# Patient Record
Sex: FEMALE | Race: WHITE
[De-identification: names, ages, dates, MRNs, and addresses within clinical notes are randomized per-mention and may not be internally consistent; named-entity substitution may affect disease eponyms.]

---

## 2017-07-03 NOTE — PD
HPI


Chief Complaint:  Pregnancy Related Problem


Time Seen by Provider:  09:39


Travel History


International Travel<30 days:  No


Contact w/Intl Traveler<30days:  No


Traveled to known affect area:  No





History of Present Illness


HPI


PATIENT C/O PASSING CLOTS THIS MORNING, NO ACTIVE ABD CRAMPS, NO N/V/D/ABD PAIN/

CP/VISUAL CHANGES EITHER.  PATIENT IS    ......ABOUT 8 WEEKS GESTATION 

BASED ON DATES  OBGYN=DR WHITE? HAS FIRST APPOINTMENT UPCOMING.





[facility]


Past Medical History


Hx Anticoagulant Therapy:  No


Pregnant?:  Pregnant





Social History


Tobacco Use:  No





Allergies-Medications


(Allergen,Severity, Reaction):  


Coded Allergies:  


     Naproxen (Verified  Allergy, Severe, 7/3/17)


Reported Meds & Prescriptions





Reported Meds & Active Scripts


Active


No Active Prescriptions or Reported Medications    








Physical Exam


Narrative


GENERAL: 


SKIN: Warm and dry.


HEAD: Atraumatic. Normocephalic. 


EYES: Pupils equal and round. No scleral icterus. No injection or drainage. 


ENT: No nasal bleeding or discharge.  Mucous membranes pink and moist.


NECK: Trachea midline. No JVD. 


CARDIOVASCULAR: Regular rate and rhythm.  


RESPIRATORY: No accessory muscle use. Clear to auscultation. Breath sounds 

equal bilaterally. 


GASTROINTESTINAL: Abdomen soft, non-tender, nondistended. INSPECTION REVEALS A 

GRAVID ABDOMEN, WITHOUT TTP/GUARDING/RIGIDITY


MUSCULOSKELETAL: Extremities without clubbing, cyanosis, or edema. No obvious 

deformities. 


NEUROLOGICAL: Awake and alert. No obvious cranial nerve deficits.  Motor 

grossly within normal limits. Five out of 5 muscle strength in the arms and 

legs.  Normal speech.


PSYCHIATRIC: Appropriate mood and affect; insight and judgment normal.





Data


Data


Last Documented VS





Vital Signs








  Date Time  Temp Pulse Resp B/P Pulse Ox O2 Delivery O2 Flow Rate FiO2


 


7/3/17 09:35 98.3 73 16 139/78 98   








Orders





 Beta Hcg (Quant/Titer) (7/3/17 09:39)


Complete Blood Count With Diff (7/3/17 09:39)


Comprehensive Metabolic Panel (7/3/17 09:39)


Type And Screen (7/3/17 09:39)


Urinalysis - C+S If Indicated (7/3/17 09:39)


Iv Access Insert/Monitor (7/3/17 09:39)


Sodium Chlor 0.9% 1000 Ml Inj (Ns 1000 M (7/3/17 09:39)


Us Pelvis (Ques Pr/Ect)W Trans (7/3/17 )





Labs








 Laboratory Tests








Test 7/3/17





 09:55


 


White Blood Count 9.6 TH/MM3


 


Red Blood Count 4.37 MIL/MM3


 


Hemoglobin 12.5 GM/DL


 


Hematocrit 37.0 %


 


Mean Corpuscular Volume 84.5 FL


 


Mean Corpuscular Hemoglobin 28.6 PG


 


Mean Corpuscular Hemoglobin 33.9 %





Concent 


 


Red Cell Distribution Width 13.8 %


 


Platelet Count 227 TH/MM3


 


Mean Platelet Volume 8.8 FL


 


Neutrophils (%) (Auto) 62.5 %


 


Lymphocytes (%) (Auto) 25.9 %


 


Monocytes (%) (Auto) 5.4 %


 


Eosinophils (%) (Auto) 5.1 %


 


Basophils (%) (Auto) 1.1 %


 


Neutrophils # (Auto) 6.0 TH/MM3


 


Lymphocytes # (Auto) 2.5 TH/MM3


 


Monocytes # (Auto) 0.5 TH/MM3


 


Eosinophils # (Auto) 0.5 TH/MM3


 


Basophils # (Auto) 0.1 TH/MM3


 


CBC Comment DIFF FINAL 


 


Differential Comment  


 


Urine Collection Type VOIDED 


 


Urine Color LIGHT-ORANGE 


 


Urine Turbidity CLOUDY 


 


Urine pH 7.0 


 


Urine Specific Gravity 1.012 


 


Urine Protein NEG mg/dL


 


Urine Glucose (UA) NEG mg/dL


 


Urine Ketones NEG mg/dL


 


Urine Occult Blood LARGE 


 


Urine Nitrite NEG 


 


Urine Bilirubin NEG 


 


Urine Leukocyte Esterase SMALL 


 


Urine RBC INNUM /hpf


 


Urine WBC 3-5 /hpf


 


Urine Squamous Epithelial 6-8 /hpf





Cells 


 


Urine Bacteria RARE /hpf


 


Microscopic Urinalysis Comment CULT NOT





 INDICATED


 


Sodium Level 140 MEQ/L


 


Potassium Level 3.8 MEQ/L


 


Chloride Level 105 MEQ/L


 


Carbon Dioxide Level 25.8 MEQ/L


 


Anion Gap 9 MEQ/L


 


Blood Urea Nitrogen 8 MG/DL


 


Creatinine 0.54 MG/DL


 


Estimat Glomerular Filtration 133 ML/MIN





Rate 


 


Random Glucose 88 MG/DL


 


Calcium Level 8.9 MG/DL


 


Total Bilirubin 0.3 MG/DL


 


Aspartate Amino Transf 12 U/L





(AST/SGOT) 


 


Alanine Aminotransferase 23 U/L





(ALT/SGPT) 


 


Alkaline Phosphatase 58 U/L


 


Total Protein 7.4 GM/DL


 


Albumin 3.1 GM/DL


 


Human Chorionic Gonadotropin, 79230 MIU/ML





Quant 


 


Blood Type A POSITIVE 


 


Antibody Screen NEGATIVE 


 


Blood Bank Comment  














[facility]


Medical Decision Making


Medical Screen Exam Complete:  Yes


Emergency Medical Condition:  Yes


Medical Record Reviewed:  Yes


Differential Diagnosis


UTI V THREATENED AB V MISSED AB V ECTOPIC


Narrative Course


CBC WNL, CMP WNL, NEG UTI ON UA, ULTRASOUND AND QUANT HCG SUGGEST IUP AT 

[id number]....WILL D/C TO FOLLOW WITH OBGYN





Diagnosis





 Primary Impression:  


 THREATENED AB


Patient Instructions:  General Instructions, Threatened Miscarriage (ED)


Scripts


No Active Prescriptions or Reported Meds


Disposition:   DISCHARGE HOME


Condition:  Stable








Korey Licona MD Jul 3, 2017 09:43

## 2017-07-03 NOTE — RADRPT
EXAM DATE/TIME:  2017 11:26 

 

[facility] COMPARISON:     

No previous studies available for comparison.

        

 

 

INDICATIONS :     

Bleeding with pregnancy.

                     

 

LAB(S):     

 

Beta-hC

                     

 

MEDICAL HISTORY :     

Pregnancy.     Gestational diabetes.

 

SURGICAL HISTORY :     

Cholecystectomy.  section.   

 

ENCOUNTER:     

Initial

 

ACUITY:     

1 day

 

PAIN SCORE:     

0/10

 

LOCATION:     

Bilateral pelvis 

                     

MEASUREMENTS:     

 

UTERUS:                                  

11.4 x 8.2 x 5.2 cm

 

ENDOMETRIAL STRIPE:      

12 mm

 

RIGHT OVARY:                      

5.0 x 3.2 x 3.2 cm

 

LEFT OVARY:                         

Non visualized. 

 

FREE FLUID:                           

No free fluid

 

CROWN RUMP LENGTH:     

1.2 cm = 7 WKS 3 DAYS

 

FHR:                                         

136 BPM

 

FINDINGS:     

There is viable intrauterine pregnancy with what looks likely small subchorionic hemorrhage.  There i
s minimal debris within the endocervical canal.  Left ovary is not visualized.  The right ovary conta
ins is 3.9 cm complex cystic mass.  There is no free fluid.

 

CONCLUSION:     

Viable but threatened intrauterine pregnancy corresponding to a 7 week 3 day gestation.

 

 

 

 Raymundo Fuchs MD FACR on 2017 at 12:53           

Board Certified Radiologist.

 This report was verified electronically.

## 2017-08-11 ENCOUNTER — HOSPITAL ENCOUNTER (OUTPATIENT)
Dept: HOSPITAL 17 - HPND | Age: 27
End: 2017-08-11
Attending: FAMILY MEDICINE
Payer: MEDICAID

## 2017-08-11 DIAGNOSIS — O20.0: Primary | ICD-10-CM

## 2017-08-11 PROCEDURE — 76801 OB US < 14 WKS SINGLE FETUS: CPT

## 2017-08-11 PROCEDURE — 76817 TRANSVAGINAL US OBSTETRIC: CPT

## 2017-09-26 ENCOUNTER — HOSPITAL ENCOUNTER (OUTPATIENT)
Dept: HOSPITAL 17 - HPND | Age: 27
End: 2017-09-26
Attending: FAMILY MEDICINE
Payer: MEDICAID

## 2017-09-26 DIAGNOSIS — O99.212: Primary | ICD-10-CM

## 2017-09-26 PROCEDURE — 76811 OB US DETAILED SNGL FETUS: CPT

## 2017-11-07 ENCOUNTER — HOSPITAL ENCOUNTER (OUTPATIENT)
Dept: HOSPITAL 17 - HPND | Age: 27
End: 2017-11-07
Attending: FAMILY MEDICINE
Payer: MEDICAID

## 2017-11-07 DIAGNOSIS — O99.212: Primary | ICD-10-CM

## 2017-11-07 DIAGNOSIS — E66.01: ICD-10-CM

## 2017-11-07 PROCEDURE — 76816 OB US FOLLOW-UP PER FETUS: CPT

## 2017-12-05 ENCOUNTER — HOSPITAL ENCOUNTER (OUTPATIENT)
Dept: HOSPITAL 17 - HPND | Age: 27
End: 2017-12-05
Attending: FAMILY MEDICINE
Payer: MEDICAID

## 2017-12-05 DIAGNOSIS — E66.01: ICD-10-CM

## 2017-12-05 DIAGNOSIS — O99.212: Primary | ICD-10-CM

## 2017-12-05 PROCEDURE — 76816 OB US FOLLOW-UP PER FETUS: CPT

## 2018-01-04 ENCOUNTER — HOSPITAL ENCOUNTER (OUTPATIENT)
Dept: HOSPITAL 17 - HPND | Age: 28
End: 2018-01-04
Attending: FAMILY MEDICINE
Payer: MEDICAID

## 2018-01-04 DIAGNOSIS — O99.213: Primary | ICD-10-CM

## 2018-01-04 DIAGNOSIS — E66.01: ICD-10-CM

## 2018-01-04 PROCEDURE — 76816 OB US FOLLOW-UP PER FETUS: CPT

## 2018-01-10 ENCOUNTER — HOSPITAL ENCOUNTER (EMERGENCY)
Dept: HOSPITAL 17 - HOBED | Age: 28
Discharge: HOME | End: 2018-01-10
Payer: MEDICAID

## 2018-01-10 DIAGNOSIS — Z3A.34: ICD-10-CM

## 2018-01-10 DIAGNOSIS — Z79.84: ICD-10-CM

## 2018-01-10 DIAGNOSIS — O24.419: ICD-10-CM

## 2018-01-10 DIAGNOSIS — O47.03: Primary | ICD-10-CM

## 2018-01-10 DIAGNOSIS — Z72.0: ICD-10-CM

## 2018-01-10 PROCEDURE — 59025 FETAL NON-STRESS TEST: CPT

## 2018-01-10 NOTE — PD
MDM


Diagnosis


Diagnosis:  


 Primary Impression:  


 34 weeks gestation of pregnancy


 Additional Impression:  


 False labor before 37 completed weeks of gestation during pregnancy in third 

trimester, antepartum


Disposition:  01 DISCHARGE HOME


Condition:  Good


Patient Instructions:  General Instructions, Gestational Diabetes (ED),  

Labor (ED), Having Your Baby: The Labor Process (GEN), Fetal Movement (ED)


Departure Forms:  Tests/Procedures











Harika Jane MD Doyle 10, 2018 22:50

## 2018-01-10 NOTE — PD
HPI


Chief Complaint


Abdominal pain


Date Seen:  Doyle 10, 2018


Time Seen:  22:18


 (Robb Goldberg MD, R3)





Travel History


International Travel<30 Days:  No


Contact w/Intl Traveler<30Days:  No


 (Robb Goldberg MD, R3)





History of Present Illness


HPI


27-year-old  at 34.3 weeks gestation, presenting with lower back pain for 

5 days.  She reports that this pain has been constant over the past 5 days.  

More recently, over the past 4 days she developed "vaginal pressure."  Today at 

approximately 11 AM, she developed stomach pressure.  She reports a tightness 

in her stomach comes every 10-12 minutes, and lasts approximately 1 minute.  It 

has not changed in intensity/severity.  The pain when she experiences it is 

rated as a 7-9 out of 10.  He denies any gush of fluid, constant loss of fluid, 

vaginal bleeding, but does report that the baby has decreased moving in 

comparison to her baseline.  She reports a clear "glob" approximately 6 days ago

, that she thought was her mucous plug.  She denies any persistent vaginal 

discharge.  She also denies any one-sided flank pain, fevers, chills, nausea, 

or vomiting.


 (Robb Goldberg MD, R3)





History


Past Medical History


*** Narrative Medical


Gestational diabetes, starting metformin-  unable to obtain.


 (Robb Goldberg MD, R3)





Obstetric History


Obstetric History


, emergency  at 36 weeks for nonreassuring fetal heart rate, C-

section at 39 weeks. 


 (Robb Goldberg MD, R3)





Past Surgical History


*** Narrative Surgical


Gallbladder removal, teeth surgery.


 (Robb Goldberg MD, R3)





Family History


Family History:  Negative


 (Robb Goldberg MD, R3)





Social History


Alcohol Use:  No


Tobacco Use:  Yes (2 packs per day early in pregnancy, reduced to 5-6 

cigarettes per day.)


Substance Abuse:  No


 (Robb Goldberg MD, R3)





Allergies-Medications


(Allergen,Severity, Reaction):  


Coded Allergies:  


     naproxen (Unverified  Allergy, Severe, 18)


Home Meds


Active Scripts


Metformin ER (Metformin ER) 1,000 Mg Talia, 1000 MG PO DAILY for Blood Sugar 

Management, #30 TAB 0 Refills


   With evening meal


   Prov:Roberto Robles MD, R3         1/8/18


Lancets (Lancets) 1 Mis Mis, UNIT .ROUTE AS DIRECTED for Blood Sugar Management

, #1 1 Refill


   Prov:Roberto Robles MD, R3         17


Blood Glucose Test Strips (Blood Glucose Test Strips) Strips Strip, UNIT .ROUTE 

AS DIRECTED for Blood Sugar Management, #1 1 Refill


   Prov:Roberto Robles MD, R3         17


Blood Glucose Monitoring W/Device (Blood Glucose Monitoring W/Device) 1 Kit Kit

, KIT .ROUTE AS DIRECTED for Blood Sugar Management, #1 0 Refills


   Prov:Yasmin Tran MD, R3         17


Ferrous Sulfate (Ferrous Sulfate) 325 Mg (65 Mg Iron) Tablet, 325 MG PO BIDPC 

for Nutritional Supplement, #60 TAB 6 Refills


   Prov:Yasmin Tran MD, R3         17


Calcium Carbonate-Cholecalciferol (Calcium 600 with Vitamin D) 600-400 mg-Unit 

Tab, 1 TAB PO DAILY for Calcium Supplement, #30 TAB 3 Refills


   Prov:Yasmin Tran MD, R3         17


Aspirin (Aspirin Low Dose) 81 Mg Chew, 81 MG CHEW DAILY, #30 TAB 1 Refill


   Prov:Yasmin Tran MD, R3         17


Prenatal Vit-Iron Carbonyl (Prenatal Plus Iron 29-1 mg) 1 Tab Tab, 1 TAB PO 

DAILY for Nutritional Supplement, #30 TAB 11 Refills


   Prov:Yasmin Tran MD, R3         17





Physical Exam


Narrative


GENERAL: Well-nourished, well-developed patient.


SKIN: Warm and dry.


HEAD: Normocephalic and atraumatic.


EYES: No scleral icterus. No injection or drainage. 


ENT: No nasal drainage noted. Mucous membranes pink. Airway patent.


NECK: Supple, trachea midline. No JVD.


CARDIOVASCULAR: Regular rate and rhythm without murmurs, gallops, or rubs. 


RESPIRATORY: Breath sounds equal bilaterally. No accessory muscle use.


BREASTS: Bilateral exam showed no masses , no retractions, no nipple discharge.


ABDOMEN/GI: Abdomen soft, non-tender, bowel sounds present, no rebound, no 

guarding 


   Gravid to 34 weeks size


GENITOURINARY: 


   External Genitalia: intact and normal in appearance


   Cervix: midposition


   Dilatation: closed        


   Effacement: 0%        


   Station: 0


   Presentation: vertex      


   Membranes: intact 


   Uterine Contractions: none


FHT's: 


   Category: 1  


   Baseline: 145   


   Reactive: y  


   Variability: mod 


   Decels:none


EXTREMITIES: No cyanosis or edema.


BACK: Nontender without obvious deformity. 


NEUROLOGICAL: Awake and alert. Motor and sensory grossly within normal limits. 


 (Robb Goldberg MD, R3)





Data


Data


Vital Signs Reviewed:  Yes


 (Robb Goldberg MD, R3)





MDM


Plan


27-year-old  with persistent pelvic and lower back pain. 





Problem #1: Intrauterine pregnancy / abdominal pain. 


Likely [place] contractions, false labor.  Cervix is closed, thick, mid 

position.  Baby is at 0 station. 


Fetal Heart tracing, category 1, and reactive.  Follow-up with PCP in 2-3 days.


Urine dipstick negative for leukocyte Estrace, negative for nitrites.  No signs 

of infection.


Reviewed fetal kick count, and signs of active labor.


Labor precautions reviewed.





SDW Dr. Kamaljit Garcia 


 (Robb Goldberg MD, R3)


Attending Attestation


The patient was seen and examined with the resident and I performed all key 

decision making.


 (Harika Jane MD)


Disposition:  01 DISCHARGE HOME


Condition:  Good











Robb Goldberg MD, R3 Doyle 10, 2018 22:25


Harika Jane MD 2018 15:08

## 2018-01-18 ENCOUNTER — HOSPITAL ENCOUNTER (EMERGENCY)
Dept: HOSPITAL 17 - HOBED | Age: 28
Discharge: HOME | End: 2018-01-18
Payer: MEDICAID

## 2018-01-18 DIAGNOSIS — O99.333: ICD-10-CM

## 2018-01-18 DIAGNOSIS — Z3A.35: ICD-10-CM

## 2018-01-18 DIAGNOSIS — M25.359: ICD-10-CM

## 2018-01-18 DIAGNOSIS — O99.89: Primary | ICD-10-CM

## 2018-01-18 DIAGNOSIS — M79.652: ICD-10-CM

## 2018-01-18 DIAGNOSIS — M79.651: ICD-10-CM

## 2018-01-18 DIAGNOSIS — F17.210: ICD-10-CM

## 2018-01-18 PROCEDURE — 84112 EVAL AMNIOTIC FLUID PROTEIN: CPT

## 2018-01-18 PROCEDURE — 59025 FETAL NON-STRESS TEST: CPT

## 2018-01-18 NOTE — PD
HPI


Chief Complaint


Fluid leakage, thigh pain, hip instability


Date Seen:  2018


Time Seen:  12:00


Travel History


International Travel<30 Days:  No


Contact w/Intl Traveler<30Days:  No


Known Affected Area:  No





History of Present Illness


HPI


Patient is a 27 year old  at 35 weeks and 4 days who presents to OB triage 

with possible leakage of amniotic fluid, thigh pain and hip instability. She is 

followed by Dr. Tran/Travis. Patient was concerned about possible 

leakage of amniotic fluid over the past three days. She voiced her concerns 

during prenatal visit today; provider was unable to perform amnisure in the 

office and sent patient to OB triage.





Patient reports bilateral thigh pain, which radiates down to her medial knees. 

She also reports hip instability.





Patient was diagnosed with gestational diabetes. She was recently prescribed 

metformin 1000 mg PO daily.


Weeks Gestation:  35


Para:  2


:  3





History


Past Medical History


Medical History:  Denies Significant Hx





Obstetric History


Obstetric History


G1 - 36 weeks, induction due to low amniotic fluid ->  


G2 - 39 weeks, repeat ; gestational diabetes and bleeding throughout 

pregnancy 


G3 - current pregnancy, gestational diabetes





Past Surgical History


*** Narrative Surgical


 x2


Cholecystectomy


Full-mouth extraction





Family History


Family History:  Negative





Social History


Alcohol Use:  No


Tobacco Use:  Yes (5-6 cigarettes/day )


Substance Abuse:  No





Allergies-Medications


(Allergen,Severity, Reaction):  


Coded Allergies:  


     naproxen (Unverified  Allergy, Severe, 18)


     ceftriaxone (Verified  Allergy, Unknown, 18)


Home Meds


Active Scripts


Metformin ER (Metformin ER) 1,000 Mg Talia, 1000 MG PO DAILY for Blood Sugar 

Management, #30 TAB 0 Refills


   With evening meal


   Prov:Roberto Robles MD, R3         18


Lancets (Lancets) 1 Mis Mis, UNIT .ROUTE AS DIRECTED for Blood Sugar Management

, #1 1 Refill


   Prov:Roberto Robles MD, R3         17


Blood Glucose Test Strips (Blood Glucose Test Strips) Strips Strip, UNIT .ROUTE 

AS DIRECTED for Blood Sugar Management, #1 1 Refill


   Prov:Roberto Robles MD, R3         17


Blood Glucose Monitoring W/Device (Blood Glucose Monitoring W/Device) 1 Kit Kit

, KIT .ROUTE AS DIRECTED for Blood Sugar Management, #1 0 Refills


   Prov:Yasmin Tran MD, R3         17


Ferrous Sulfate (Ferrous Sulfate) 325 Mg (65 Mg Iron) Tablet, 325 MG PO BIDPC 

for Nutritional Supplement, #60 TAB 6 Refills


   Prov:Yasmin Tran MD, R3         17


Prenatal Vit-Iron Carbonyl (Prenatal Plus Iron 29-1 mg) 1 Tab Tab, 1 TAB PO 

DAILY for Nutritional Supplement, #30 TAB 11 Refills


   Prov:Yasmin Tran MD, R3         17


Discontinued Scripts


Calcium Carbonate-Cholecalciferol (Calcium 600 with Vitamin D) 600-400 mg-Unit 

Tab, 1 TAB PO DAILY for Calcium Supplement, #30 TAB 3 Refills


   Prov:Yasmin Tran MD, R3         17


Aspirin (Aspirin Low Dose) 81 Mg Chew, 81 MG CHEW DAILY, #30 TAB 1 Refill


   Prov:Yasmin Tran MD, R3         17





Review of Systems


Except as stated in HPI:  all other systems reviewed are Neg





Physical Exam


Narrative


GENERAL: Well-nourished, well-developed patient.


SKIN: Warm and dry.


HEAD: Normocephalic and atraumatic.


EYES: No scleral icterus. No injection or drainage. 


ENT: No nasal drainage noted. Mucous membranes pink. Airway patent.


NECK: Supple, trachea midline. No JVD.


CARDIOVASCULAR: Regular rate and rhythm without murmurs, gallops, or rubs. 


RESPIRATORY: Breath sounds equal bilaterally. No accessory muscle use.


ABDOMEN/GI: Abdomen soft, non-tender, bowel sounds present, no rebound, no 

guarding 


   Gravid to 35 weeks size


GENITOURINARY: 


   Membranes: Intact 


   Uterine Contractions: None 


FHT's: 


   Category: 1   


   Baseline: wnl    


   Reactive: Reactive    


   Variability: Moderate


   Decels: None  


EXTREMITIES: No cyanosis or edema.


BACK: Nontender without obvious deformity. No CVA tenderness.


NEUROLOGICAL: Awake and alert. Motor and sensory grossly within normal limits. 

Five out of 5 muscle strength in all muscle groups. Normal speech.





Data


Data


Vital Signs Reviewed:  Yes





[facility]


Medical Record Reviewed:  Yes


Plan


Patient is a 27 year old  at 35 weeks and 4 days who presents to OB triage 

with possible leakage of amniotic fluid, thigh pain and hip instability. She is 

followed by Dr. Tran/Travis. 





* IUP- Category I tracing, reassuring.


* Amisure - negative. 


* Counselled patient on pain control using Tylenol and heating pad/warm bath or 

shower. 


 * Heating pad not to be applied directly over abdomen.


 * Warm rather than hot bath or shower safe for fetus. 


* Encouraged to return to OB triage if signs/symptoms worsen. 


* Signs of labor discussed.


Diagnosis


Diagnosis:  


 Primary Impression:  


 Hip instability


 Additional Impression:  


 Bilateral thigh pain


 Ruled Out:  Amniotic fluid leaking


Disposition:  01 DISCHARGE HOME


Condition:  Stable











Obdulia Lindsay MD R1 2018 12:10

## 2018-02-05 ENCOUNTER — HOSPITAL ENCOUNTER (INPATIENT)
Dept: HOSPITAL 17 - HOBED | Age: 28
LOS: 2 days | Discharge: HOME | End: 2018-02-07
Attending: OBSTETRICS & GYNECOLOGY | Admitting: OBSTETRICS & GYNECOLOGY
Payer: MEDICAID

## 2018-02-05 VITALS
SYSTOLIC BLOOD PRESSURE: 121 MMHG | HEART RATE: 70 BPM | TEMPERATURE: 98 F | DIASTOLIC BLOOD PRESSURE: 69 MMHG | RESPIRATION RATE: 18 BRPM

## 2018-02-05 VITALS — RESPIRATION RATE: 18 BRPM | HEART RATE: 79 BPM | OXYGEN SATURATION: 100 %

## 2018-02-05 VITALS
SYSTOLIC BLOOD PRESSURE: 104 MMHG | TEMPERATURE: 97.9 F | HEART RATE: 73 BPM | RESPIRATION RATE: 18 BRPM | DIASTOLIC BLOOD PRESSURE: 56 MMHG | OXYGEN SATURATION: 100 %

## 2018-02-05 VITALS
SYSTOLIC BLOOD PRESSURE: 110 MMHG | DIASTOLIC BLOOD PRESSURE: 54 MMHG | OXYGEN SATURATION: 99 % | RESPIRATION RATE: 18 BRPM | HEART RATE: 83 BPM

## 2018-02-05 VITALS — HEIGHT: 68 IN | WEIGHT: 279 LBS | BODY MASS INDEX: 42.28 KG/M2

## 2018-02-05 VITALS
HEART RATE: 79 BPM | RESPIRATION RATE: 18 BRPM | OXYGEN SATURATION: 98 % | SYSTOLIC BLOOD PRESSURE: 109 MMHG | TEMPERATURE: 98.1 F | DIASTOLIC BLOOD PRESSURE: 50 MMHG

## 2018-02-05 VITALS — DIASTOLIC BLOOD PRESSURE: 51 MMHG | SYSTOLIC BLOOD PRESSURE: 104 MMHG

## 2018-02-05 DIAGNOSIS — R19.7: ICD-10-CM

## 2018-02-05 DIAGNOSIS — Z3A.38: ICD-10-CM

## 2018-02-05 DIAGNOSIS — Z86.32: ICD-10-CM

## 2018-02-05 DIAGNOSIS — O34.211: ICD-10-CM

## 2018-02-05 DIAGNOSIS — Z30.2: ICD-10-CM

## 2018-02-05 DIAGNOSIS — O26.893: ICD-10-CM

## 2018-02-05 LAB
BACTERIA #/AREA URNS HPF: (no result) /HPF
BASOPHILS # BLD AUTO: 0.1 TH/MM3 (ref 0–0.2)
BASOPHILS NFR BLD: 0.3 % (ref 0–2)
COLOR UR: YELLOW
EOSINOPHIL # BLD: 0.5 TH/MM3 (ref 0–0.4)
EOSINOPHIL NFR BLD: 3.4 % (ref 0–4)
ERYTHROCYTE [DISTWIDTH] IN BLOOD BY AUTOMATED COUNT: 13.4 % (ref 11.6–17.2)
GLUCOSE UR STRIP-MCNC: (no result) MG/DL
HCT VFR BLD CALC: 36 % (ref 35–46)
HGB BLD-MCNC: 12.3 GM/DL (ref 11.6–15.3)
HGB UR QL STRIP: (no result)
HYALINE CASTS #/AREA URNS LPF: 1 /LPF
KETONES UR STRIP-MCNC: (no result) MG/DL
LYMPHOCYTES # BLD AUTO: 3 TH/MM3 (ref 1–4.8)
LYMPHOCYTES NFR BLD AUTO: 18.7 % (ref 9–44)
MCH RBC QN AUTO: 31.3 PG (ref 27–34)
MCHC RBC AUTO-ENTMCNC: 34.2 % (ref 32–36)
MCV RBC AUTO: 91.3 FL (ref 80–100)
MONOCYTE #: 0.7 TH/MM3 (ref 0–0.9)
MONOCYTES NFR BLD: 4.3 % (ref 0–8)
MUCOUS THREADS #/AREA URNS LPF: (no result) /LPF
NEUTROPHILS # BLD AUTO: 11.6 TH/MM3 (ref 1.8–7.7)
NEUTROPHILS NFR BLD AUTO: 73.3 % (ref 16–70)
NITRITE UR QL STRIP: (no result)
PLATELET # BLD: 292 TH/MM3 (ref 150–450)
PMV BLD AUTO: 8.4 FL (ref 7–11)
RBC # BLD AUTO: 3.94 MIL/MM3 (ref 4–5.3)
SP GR UR STRIP: 1.01 (ref 1–1.03)
SQUAMOUS #/AREA URNS HPF: 6 /HPF (ref 0–5)
URINE LEUKOCYTE ESTERASE: (no result)
WBC # BLD AUTO: 15.9 TH/MM3 (ref 4–11)

## 2018-02-05 PROCEDURE — 86850 RBC ANTIBODY SCREEN: CPT

## 2018-02-05 PROCEDURE — 80307 DRUG TEST PRSMV CHEM ANLYZR: CPT

## 2018-02-05 PROCEDURE — 85025 COMPLETE CBC W/AUTO DIFF WBC: CPT

## 2018-02-05 PROCEDURE — 59025 FETAL NON-STRESS TEST: CPT

## 2018-02-05 PROCEDURE — 81001 URINALYSIS AUTO W/SCOPE: CPT

## 2018-02-05 PROCEDURE — 88302 TISSUE EXAM BY PATHOLOGIST: CPT

## 2018-02-05 PROCEDURE — 86901 BLOOD TYPING SEROLOGIC RH(D): CPT

## 2018-02-05 PROCEDURE — 87086 URINE CULTURE/COLONY COUNT: CPT

## 2018-02-05 PROCEDURE — 0UB70ZZ EXCISION OF BILATERAL FALLOPIAN TUBES, OPEN APPROACH: ICD-10-PCS | Performed by: OBSTETRICS & GYNECOLOGY

## 2018-02-05 PROCEDURE — 86900 BLOOD TYPING SEROLOGIC ABO: CPT

## 2018-02-05 RX ADMIN — OXYTOCIN SCH MLS/HR: 10 INJECTION, SOLUTION INTRAMUSCULAR; INTRAVENOUS at 12:06

## 2018-02-05 RX ADMIN — Medication SCH ML: at 21:00

## 2018-02-05 RX ADMIN — OXYTOCIN SCH MLS/HR: 10 INJECTION, SOLUTION INTRAMUSCULAR; INTRAVENOUS at 12:10

## 2018-02-05 NOTE — PD.OB.DELI
Procedure Note


 Section Procedure


Pre Op Diagnosis:  


(1) Encounter for sterilization


(2) Delivered by  section


(3) Previous  section


(4) Uterine contractions during pregnancy


Post Op Diagnosis:  


(1) Uterine contractions during pregnancy


(2) Delivered by  section


(3) Encounter for sterilization


Performed by


Ge Seaman


Procedure:  Repeat Low Transverse  Sec


Indication for delivery:  Desired elective repeat  (BTL)


Previous condition:  None


Informed consent obtained:  For anesthesia, For procedure


Confirmed correct:  Patient, Procedure, Time-out taken


Anesthesia:  Spinal


Monitoring during procedure:  Blood pressure monitoring, Cardiac monitor


Urinary catheter:  Inserted using sterile technique, To dependent drainage


Sterile preparation:  Duraprep


Position:  Supine with wedge to left side





Operative Features


Skin Incision:  Pfannenstiel


Uterine Incision:  Low transverse w/knife / blunt ext


Presentation:  Occiput anterior


Delivery date:  2018


Delivery time:  12:55


Delivery of infant:  Assisted (vacuum single easy pull), Uneventful


Infant:  Male, Single


One Minute APGAR:  9


Five Minute APGAR:  9


Status of infant:  Viable


Placenta delivered:  Intact


Estimated blood loss:  500


Procedure tolerated:  Well


Maternal Condition:  Stable


Fetal Condition:  Stable











Ge Seaman MD 2018 13:32

## 2018-02-05 NOTE — HHI.HP
History & Physical


H&P


HPI


Chief Complaint


contractions


Date Seen:  2018


Time Seen:  09:26


Travel History


International Travel<30 Days:  No


Contact w/Intl Traveler<30Days:  No





History of Present Illness


HPI


29 y/o  at 38/1 weeks presents with contractions. 


She states that she started having contractions last night around 430pm. Stated 

they were initially every 15 minutes or so and have now increased to every 10 

minutes or so. Denies any vaginal bleeding or gush of fluids. Has had leakage 

of urine the last few weeks, but nothing new. Endorses fetal movement. States 

she has had some vomiting overnight as well, 2x, non-bloody. Also having more 

frequent diarrhea, but has had loose stools throughout the pregnancy from 

taking metformin. She was diagnosed with GDM during this pregnancy and during 

last pregnancy as well. She is scheduled for repeat  in one week. 

Otherwise, denies any chest pain, SOB, blurry vision, dysuria, leg pain/

swelling. She receives care from Dr. Tran.


Weeks Gestation:  38


Para:  2


:  3





 History (Limited) 


History


Past Medical History


*** Narrative Medical


GDM





Obstetric History


Obstetric History





2009: emergent 


2014: C/S at 39 weeks





Past Surgical History


*** Narrative Surgical


 x2


Cholecystectomy


Mouth extraction





Family History


Family History:  Negative





Social History


Alcohol Use:  No


Tobacco Use:  No


Substance Abuse:  No





 Allergies-Medications 


Allergies-Medications


(Allergen,Severity, Reaction):  


Coded Allergies:  


     naproxen (Unverified  Allergy, Severe, 18)


     ceftriaxone (Verified  Allergy, Unknown, 18)


Home Meds


Active Scripts


Metformin ER (Metformin ER) 1,000 Mg Talia, 1000 MG PO DAILY for Blood Sugar 

Management, #30 TAB 0 Refills


   With evening meal


   Prov:Yasmin Tran MD, R3         18


Breast Pump (Breast Pump) 1 Mis Mis, EA .ROUTE AS DIRECTED, #1


   Prov:Yasmin Tran MD, R3         18


Lancets (Lancets) 1 Mis Mis, UNIT .ROUTE AS DIRECTED for Blood Sugar Management

, #1 1 Refill


   Prov:Roberto Robles MD, R3         17


Blood Glucose Test Strips (Blood Glucose Test Strips) Strips Strip, UNIT .ROUTE 

AS DIRECTED for Blood Sugar Management, #1 1 Refill


   Prov:Roberto Robles MD, R3         17


Blood Glucose Monitoring W/Device (Blood Glucose Monitoring W/Device) 1 Kit Kit

, KIT .ROUTE AS DIRECTED for Blood Sugar Management, #1 0 Refills


   Prov:Yasmin Tran MD, R3         17


Ferrous Sulfate (Ferrous Sulfate) 325 Mg (65 Mg Iron) Tablet, 325 MG PO BIDPC 

for Nutritional Supplement, #60 TAB 6 Refills


   Prov:Yasmin Tran MD, R3         17


Prenatal Vit-Iron Carbonyl (Prenatal Plus Iron 29-1 mg) 1 Tab Tab, 1 TAB PO 

DAILY for Nutritional Supplement, #30 TAB 11 Refills


   Prov:Yasmin Tran MD, R3         17





 ROS 


Review of Systems


General / Constitutional:  Weight Gain, No: Fever, Weight Loss, Chills, Other


Eyes:  No: Diploplia, Blurred Vision, Visual changes, Pain, Photophobia


HENT:  No: Headaches, Vertigo, Lightheadedness


Cardiovascular:  No: Irregular Rhythm, Chest Pain or Discomfort, Palpitations, 

Tachycardia, Syncope, Varicosities, Edema, Cyanosis


Respiratory:  No: Cough, Short of Breath, Other


Gastrointestinal:  Nausea, Vomiting, No: Diarrhea, Abdominal Pain


Genitourinary:  No: Frequency, Dysuria, Decreased Urinary Output, Oliguria, 

Pelvic Pain, Discharge, Menorrhagia, Vaginal Bleeding


Musculoskeletal:  No: Limited ROM, Weakness, Cramping, Edema, Pain


Skin:  No Rash, No Itching, No Dryness, No Lumps, No Change in Pigmentation, No 

Change in Nails, No Alopecia, No Lesions


Neurologic:  No: Weakness, Dizziness, Syncope, Focal Abnormalities, 

Coordination Problem, Headache, Slurred Speech, Seizures


Psychiatric:  No: Depression, Suicidal Ideations, Homicidal Ideation


Endocrine:  No: Heat Intolerance, Cold Intolerance, Polydipsia, Polyuria, Other





 Physical Exam 


Physical Exam


Narrative


GENERAL: Well-nourished, well-developed patient.


SKIN: Warm and dry.


HEAD: Normocephalic and atraumatic.


EYES: No scleral icterus. No injection or drainage. 


ENT: No nasal drainage noted. Mucous membranes pink. Airway patent.


NECK: Supple, trachea midline. No JVD.


CARDIOVASCULAR: Regular rate and rhythm without murmurs, gallops, or rubs. 


RESPIRATORY: Breath sounds equal bilaterally. No accessory muscle use.


ABDOMEN/GI: Abdomen soft, non-tender, bowel sounds present, no rebound, no 

guarding 


   Gravid to 38 weeks size


GENITOURINARY: 


   External Genitalia: intact and normal in appearance


   Cervix: thick, posterior


   Dilatation: 0          


   Effacement: 0          


   Station: -3  


   Presentation: vertex        


   Membranes: intact


   Uterine Contractions: q5-7 minutes


FHT's: 


   Category: 1   


   Baseline: 130   


   Reactive: yes   


   Variability: moderate  


   Decels: none  


EXTREMITIES: No cyanosis or edema.


BACK: Nontender without obvious deformity. No CVA tenderness.


NEUROLOGICAL: Awake and alert. Motor and sensory grossly within normal limits. 

Five out of 5 muscle strength in all muscle groups. Normal speech.





 Data 


Vital Signs Reviewed:  Yes


Group B Strep:  Positive





 MDM 


MDM


Medical Record Reviewed:  Yes


Interpretation(s)


29 y/o  at 38/1 weeks presents with contractions.


Closed cervix


Category 1 FHT with contractions q5-10 minutes, mildly painful





-Monitor FHT


-Oral hydration


-Monitor vitals


Narrative Course / MDM


After oral hydration, pt states contractions becoming more frequent and painful


Category 1 FHT with contractions q3-5 minutes





-Admit for repeat  today with BTL


-Pre-op orders and precautions


-Continuous FHT











Pelon Benz MD 2018 11:18

## 2018-02-06 VITALS — TEMPERATURE: 98 F | HEART RATE: 82 BPM | RESPIRATION RATE: 16 BRPM

## 2018-02-06 VITALS
HEART RATE: 88 BPM | OXYGEN SATURATION: 98 % | SYSTOLIC BLOOD PRESSURE: 139 MMHG | TEMPERATURE: 98.1 F | DIASTOLIC BLOOD PRESSURE: 71 MMHG | RESPIRATION RATE: 16 BRPM

## 2018-02-06 VITALS
SYSTOLIC BLOOD PRESSURE: 123 MMHG | TEMPERATURE: 98.3 F | HEART RATE: 83 BPM | DIASTOLIC BLOOD PRESSURE: 63 MMHG | OXYGEN SATURATION: 97 % | RESPIRATION RATE: 18 BRPM

## 2018-02-06 VITALS
OXYGEN SATURATION: 99 % | RESPIRATION RATE: 16 BRPM | TEMPERATURE: 98.2 F | DIASTOLIC BLOOD PRESSURE: 53 MMHG | SYSTOLIC BLOOD PRESSURE: 115 MMHG | HEART RATE: 80 BPM

## 2018-02-06 LAB
BASOPHILS # BLD AUTO: 0.1 TH/MM3 (ref 0–0.2)
BASOPHILS NFR BLD: 0.3 % (ref 0–2)
EOSINOPHIL # BLD: 0.3 TH/MM3 (ref 0–0.4)
EOSINOPHIL NFR BLD: 1.4 % (ref 0–4)
ERYTHROCYTE [DISTWIDTH] IN BLOOD BY AUTOMATED COUNT: 13.3 % (ref 11.6–17.2)
HCT VFR BLD CALC: 32.9 % (ref 35–46)
HGB BLD-MCNC: 11.3 GM/DL (ref 11.6–15.3)
LYMPHOCYTES # BLD AUTO: 4.2 TH/MM3 (ref 1–4.8)
LYMPHOCYTES NFR BLD AUTO: 19.6 % (ref 9–44)
MCH RBC QN AUTO: 31.4 PG (ref 27–34)
MCHC RBC AUTO-ENTMCNC: 34.4 % (ref 32–36)
MCV RBC AUTO: 91.4 FL (ref 80–100)
MONOCYTE #: 1.2 TH/MM3 (ref 0–0.9)
MONOCYTES NFR BLD: 5.5 % (ref 0–8)
NEUTROPHILS # BLD AUTO: 15.7 TH/MM3 (ref 1.8–7.7)
NEUTROPHILS NFR BLD AUTO: 73.2 % (ref 16–70)
PLATELET # BLD: 276 TH/MM3 (ref 150–450)
PMV BLD AUTO: 8.5 FL (ref 7–11)
RBC # BLD AUTO: 3.6 MIL/MM3 (ref 4–5.3)
WBC # BLD AUTO: 21.5 TH/MM3 (ref 4–11)

## 2018-02-06 RX ADMIN — IBUPROFEN PRN MG: 600 TABLET, FILM COATED ORAL at 01:12

## 2018-02-06 RX ADMIN — OXYTOCIN SCH MLS/HR: 10 INJECTION, SOLUTION INTRAMUSCULAR; INTRAVENOUS at 00:09

## 2018-02-06 RX ADMIN — IBUPROFEN PRN MG: 600 TABLET, FILM COATED ORAL at 07:50

## 2018-02-06 RX ADMIN — OXYTOCIN SCH MLS/HR: 10 INJECTION, SOLUTION INTRAMUSCULAR; INTRAVENOUS at 01:01

## 2018-02-06 RX ADMIN — IBUPROFEN PRN MG: 600 TABLET, FILM COATED ORAL at 15:35

## 2018-02-06 RX ADMIN — Medication SCH ML: at 19:55

## 2018-02-06 RX ADMIN — IBUPROFEN PRN MG: 600 TABLET, FILM COATED ORAL at 20:57

## 2018-02-06 RX ADMIN — OXYCODONE HYDROCHLORIDE AND ACETAMINOPHEN PRN TAB: 5; 325 TABLET ORAL at 13:13

## 2018-02-06 RX ADMIN — OXYTOCIN SCH MLS/HR: 10 INJECTION, SOLUTION INTRAMUSCULAR; INTRAVENOUS at 06:12

## 2018-02-06 RX ADMIN — OXYTOCIN SCH MLS/HR: 10 INJECTION, SOLUTION INTRAMUSCULAR; INTRAVENOUS at 21:01

## 2018-02-06 RX ADMIN — OXYTOCIN SCH MLS/HR: 10 INJECTION, SOLUTION INTRAMUSCULAR; INTRAVENOUS at 03:36

## 2018-02-06 NOTE — MP
cc:

JIMBO SEAMAN

****

 

 

DATE OF SURGERY

2018

 

PROCEDURE

1. Repeat low transverse  section.

2. Bilateral tubal ligation.

 

PREOPERATIVE DIAGNOSIS

1. Previous  x2 in active labor.

2. Desires sterilization.

 

POSTOPERATIVE DIAGNOSIS

1. Previous  x2 in active labor.

2. Desires sterilization.

 

SURGEON

Dr. Jimbo Seaman.

 

ESTIMATED BLOOD LOSS

500 cc.

 

COMPLICATIONS

None.

 

FINDINGS

Live male infant, Apgars 9 and 9.

 

PROCEDURE IN DETAIL

After informed consent the patient was taken to the operating room where she

was placed under spinal anesthesia, placed in supine position with a left

lateral tilt.  The abdomen, perineum and vagina were prepped and draped in

normal sterile fashion.  A Bullock catheter was then placed to gravity and

draining well.  Once the patient was prepped and draped and anesthesia was

assured a timeout was taken.  The patient agreed to the sterilization and the

.  The patient was identified.

 

A Pfannenstiel skin incision was carried sharply through the skin to the old

scarred fascia.  The fascia was nicked in the midline.  The incision was

extended laterally using Lynch scissors, secondary scar tissue, rectus muscle

 in the midline.  The peritoneum was entered bluntly with a finger.

The incision was extended laterally using blunt traction.  A hand was placed in

the abdomen.  The uterus was noted to be midline.  A bladder flap was created

by dissecting the bladder off the lower uterine segment.

 

A low transverse uterine incision was then made, carried sharply in the uterine

cavity.  Clear fluid was noted.  The incision was extended laterally using

blunt traction.  A hand was placed in the uterus.  The infant's head was guided

to the incision.  Despite fundal pressure, secondary scar tissue, the baby was

moving left-to-right and the baby did not readily deliver.  A  vacuum was

applied to the occiput portion of the infant's head and using gentle traction

upward, not exceeding manufacture's pressure, the infant delivered.  The nose

and mouth were suctioned well.  The infant remained 45 seconds until the baby

was crying well and had good tone and color.  The cord was clamped and cut and

the infant was handed to pediatrics in attendance.  Cord blood was collected.

The placenta was delivered manually.  The uterus was exteriorized and wiped

free from all remaining products of conception.  The uterine incision was

closed with running locking stitch of chromic suture.  Good hemostasis was

achieved.

 

The fallopian tubes were tied bilaterally in Jose Miguel fashion.  The left tube

was tagged.

 

The uterus was placed back in the abdomen and noted to be hemostatic.  The

peritoneum was closed with Vicryl suture.  The fascia was closed with Vicryl

suture and subcuticular and subcutaneous tissue sutures were placed.  The

incision was closed well.  There was no active bleeding.  The patient tolerated

the procedure well.  She was taken to the recovery room in stable condition.

 

                              _________________________________

                              MD KATERINA Godinez/OZZY

D:  2018/1:32 PM

T:  2018/10:18 AM

Visit #:  [id number]

Job #:  [id number]

## 2018-02-06 NOTE — HHI.OB
Subjective


Post Operative Day:  1


Remarks


POD#1. Patient is doing well this morning. She is ambulating and voiding 

without difficulty. Minimal vaginal bleeding. Passing gas. Successfully breast 

feeding. No cp, n/v/d, sob.





Objective


Vitals/I&O





Vital Signs








  Date Time  Temp Pulse Resp B/P (MAP) Pulse Ox O2 Delivery O2 Flow Rate FiO2


 


18 07:43  80  115/53 (73)    


 


18 07:43 98.2  16  99   


 


18 15:00    121/69 (86)    


 


18 15:00 98.0 70 18     


 


18 14:22  73 18 104/56 (72)    


 


18 14:22 97.9    100   


 


18 14:07  83 18 110/54 (72)    


 


18 14:07     99   


 


18 13:54  79 18  100   


 


18 13:52    104/51 (68)    


 


18 13:38 98.1 79 18 109/50 (69) 98   








Result Diagram:  


18 0533





Objective Remarks


GENERAL: Well-nourished, well-developed patient.


CARDIOVASCULAR: Regular rate and rhythm without murmurs, gallops, or rubs. 


RESPIRATORY: Breath sounds equal bilaterally. No accessory muscle use.


ABDOMEN/GI: Abdomen soft, non-tender, bowel sounds present. 


   Incision: Clean, dry and intact.


   Fundus: Firm, non-tender at umbilicus.


GENITOURINARY: Light to moderate bleeding.


EXTREMITIES: No cyanosis or edema, non-tender, without signs of DVT.


Medications and IVs





Current Medications








 Medications


  (Trade)  Dose


 Ordered  Sig/Luba


 Route  Start Time


 Stop Time Status Last Admin


 


 Lactated Ringer's  1,000 ml @ 


 150 mls/hr  Q6H40M


 IV  18 11:41


    18 12:10


 


 


  (Bicitra Liq)  30 ml  ON  CALL


 PO  18 12:45


 18 12:44  18 12:10


 


 


 Clindamycin


 Phosphate 600 mg/


 Sodium Chloride  104 ml @ 


 200 mls/hr  ON  CALL


 IV  18 12:15


 18 12:14   


 


 


 Lactated Ringer's  1,000 ml @ 


 100 mls/hr  Q10H


 IV  18 18:25


 18 14:24  18 00:09


 


 


 Oxytocin  500 ml @ 


 100 mls/hr  UNSCH X1  PRN


 IV  18 23:30


 18 23:29   


 


 


  (NS Flush)  2 ml  BID


 IV FLUSH  18 21:00


     


 


 


  (NS Flush)  2 ml  UNSCH  PRN


 IV FLUSH  18 13:30


     


 


 


  (Mylicon Chew)  80 mg  QID  PRN


 PO  18 13:30


     


 


 


  (Motrin)  600 mg  Q6H  PRN


 PO  18 13:30


    18 07:50


 


 


  (Percocet  5-325


 Mg)  1 tab  Q4H  PRN


 PO  18 13:30


     


 


 


  (Percocet  5-325


 Mg)  2 tab  Q4H  PRN


 PO  18 13:30


     


 


 


  (M-M-R Ii Inj)  0.5 ml  ONCE ONCE


 SQ  18 16:00


 18 16:01   


 


 


  (Boostrix Inj)  0.5 ml  ONCE ONCE


 IM  18 16:00


 18 16:01   


 


 


 Miscellaneous


 Information  NO SYSTEMIC


 NARCOTICS TO BE


 GIVEN FO...  UNSCH  PRN


 .XX  18 12:36


 18 12:35   


 


 


  (Narcan Inj)  0.4 mg  UNSCH  PRN


 IV PUSH  18 12:36


 18 12:35   


 


 


  (Benadryl Inj)  25 mg  Q6H  PRN


 IV PUSH  18 12:36


 18 12:35   


 


 


  (Benadryl)  50 mg  Q6H  PRN


 PO  18 12:36


 18 12:35   


 


 


 Miscellaneous


 Information  ALL


 NURSING


 DEPARTMENTS  UNSCH  PRN


 .XX  18 12:36


 18 12:35   


 











Assessment/Plan


Problem List:  


(1)  delivery delivered


ICD Codes:  O82 - Encounter for  delivery without indication


Plan:  [28] yo  female POD # 1. 


- AFVSS, Hgb 11.3. Asymptomatic. Vitals stable. 


- Continue routine postpartum care 


- Motrin and Percocet PRN pain 


- Encourage OOB 


-Incision check one week after discharge


- Pelvic rest x 6 wks  


- Contraception: Tubal ligation, is aware of pelvic rest for 6 weeks. 


- Anticipate D/C 1-2 days. 














Roberto Robles MD, R3 2018 09:36

## 2018-02-06 NOTE — HHI.OB
Subjective


Post Partum Day:  1


Remarks


doing well





Objective


Vitals/I&O





Vital Signs








  Date Time  Temp Pulse Resp B/P (MAP) Pulse Ox O2 Delivery O2 Flow Rate FiO2


 


18 07:43  80  115/53 (73)    


 


18 07:43 98.2  16  99   


 


18 15:00    121/69 (86)    


 


18 15:00 98.0 70 18     


 


18 14:22  73 18 104/56 (72)    


 


18 14:22 97.9    100   


 


18 14:07  83 18 110/54 (72)    


 


18 14:07     99   


 


18 13:54  79 18  100   


 


18 13:52    104/51 (68)    


 


18 13:38 98.1 79 18 109/50 (69) 98   








Objective Remarks


GENERAL: Well-nourished, well-developed patient.





ABDOMEN/GI: Abdomen soft, non-tender. 


   Fundus: Firm, non-tender at umbilicus.


GENITOURINARY: Light to moderate bleeding.


EXTREMITIES: No cyanosis or edema, non-tender, without signs of DVT.


Medications and IVs





Current Medications








 Medications


  (Trade)  Dose


 Ordered  Sig/Luba


 Route  Start Time


 Stop Time Status Last Admin


 


 Lactated Ringer's  1,000 ml @ 


 150 mls/hr  Q6H40M


 IV  18 11:41


    18 12:10


 


 


  (Bicitra Liq)  30 ml  ON  CALL


 PO  18 12:45


 18 12:44  18 12:10


 


 


 Clindamycin


 Phosphate 600 mg/


 Sodium Chloride  104 ml @ 


 200 mls/hr  ON  CALL


 IV  18 12:15


 18 12:14   


 


 


 Lactated Ringer's  1,000 ml @ 


 100 mls/hr  Q10H


 IV  18 18:25


 18 14:24  18 00:09


 


 


 Oxytocin  500 ml @ 


 100 mls/hr  UNSCH X1  PRN


 IV  18 23:30


 18 23:29   


 


 


  (NS Flush)  2 ml  BID


 IV FLUSH  18 21:00


     


 


 


  (NS Flush)  2 ml  UNSCH  PRN


 IV FLUSH  18 13:30


     


 


 


  (Mylicon Chew)  80 mg  QID  PRN


 PO  18 13:30


     


 


 


  (Motrin)  600 mg  Q6H  PRN


 PO  18 13:30


    18 07:50


 


 


  (Percocet  5-325


 Mg)  1 tab  Q4H  PRN


 PO  18 13:30


     


 


 


  (Percocet  5-325


 Mg)  2 tab  Q4H  PRN


 PO  18 13:30


     


 


 


  (M-M-R Ii Inj)  0.5 ml  ONCE ONCE


 SQ  18 16:00


 18 16:01   


 


 


  (Boostrix Inj)  0.5 ml  ONCE ONCE


 IM  18 16:00


 18 16:01   


 


 


 Miscellaneous


 Information  NO SYSTEMIC


 NARCOTICS TO BE


 GIVEN FO...  UNSCH  PRN


 .XX  18 12:36


 18 12:35   


 


 


  (Narcan Inj)  0.4 mg  UNSCH  PRN


 IV PUSH  18 12:36


 18 12:35   


 


 


  (Benadryl Inj)  25 mg  Q6H  PRN


 IV PUSH  18 12:36


 18 12:35   


 


 


  (Benadryl)  50 mg  Q6H  PRN


 PO  18 12:36


 18 12:35   


 


 


 Miscellaneous


 Information  ALL


 NURSING


 DEPARTMENTS  UNSCH  PRN


 .XX  18 12:36


 18 12:35   


 











Assessment/Plan


Problem List:  


(1)  delivery delivered


ICD Codes:  O82 - Encounter for  delivery without indication


Plan:  [28] yo  female POD # 1. 


- AFVSS, Hgb 11.3. Asymptomatic. Vitals stable. 


- Continue routine postpartum care 


- Motrin and Percocet PRN pain 


- Encourage OOB 


-Incision check one week after discharge


- Pelvic rest x 6 wks  


- Contraception: Tubal ligation, is aware of pelvic rest for 6 weeks. 


- Anticipate D/C 1-2 days. 














Ge Seaman MD 2018 12:18

## 2018-02-07 VITALS
RESPIRATION RATE: 18 BRPM | DIASTOLIC BLOOD PRESSURE: 64 MMHG | TEMPERATURE: 98.1 F | HEART RATE: 80 BPM | SYSTOLIC BLOOD PRESSURE: 114 MMHG

## 2018-02-07 RX ADMIN — OXYCODONE HYDROCHLORIDE AND ACETAMINOPHEN PRN TAB: 5; 325 TABLET ORAL at 08:47

## 2018-02-07 RX ADMIN — OXYTOCIN SCH MLS/HR: 10 INJECTION, SOLUTION INTRAMUSCULAR; INTRAVENOUS at 01:01

## 2018-02-07 RX ADMIN — IBUPROFEN PRN MG: 600 TABLET, FILM COATED ORAL at 08:47

## 2018-02-07 RX ADMIN — IBUPROFEN PRN MG: 600 TABLET, FILM COATED ORAL at 02:28

## 2018-02-07 NOTE — HHI.OB
Subjective


Post Partum Day:  2


Remarks


doing well after CS BTL repeat. Ok to Dc home today or tomorrow





Objective


Vitals/I&O





Vital Signs








  Date Time  Temp Pulse Resp B/P (MAP) Pulse Ox O2 Delivery O2 Flow Rate FiO2


 


18 20:00  83 18 123/63 (83) 97   


 


18 20:00 98.3       


 


18 16:28  82 16     


 


18 16:28 98.0       


 


18 12:00 98.1 88 16 139/71 (93) 98   


 


18 07:43  80  115/53 (73)    


 


18 07:43 98.2  16  99   








Objective Remarks


GENERAL: Well-nourished, well-developed patient.





ABDOMEN/GI: Abdomen soft, non-tender. 


   Fundus: Firm, non-tender at umbilicus.


GENITOURINARY: Light to moderate bleeding.


EXTREMITIES: No cyanosis or edema, non-tender, without signs of DVT.


Medications and IVs





Current Medications








 Medications


  (Trade)  Dose


 Ordered  Sig/Luba


 Route  Start Time


 Stop Time Status Last Admin


 


 Lactated Ringer's  1,000 ml @ 


 150 mls/hr  Q6H40M


 IV  18 11:41


    18 12:10


 


 


  (Bicitra Liq)  30 ml  ON  CALL


 PO  18 12:45


 18 12:44  18 12:10


 


 


 Clindamycin


 Phosphate 600 mg/


 Sodium Chloride  104 ml @ 


 200 mls/hr  ON  CALL


 IV  18 12:15


 18 12:14   


 


 


  (NS Flush)  2 ml  BID


 IV FLUSH  18 21:00


     


 


 


  (NS Flush)  2 ml  UNSCH  PRN


 IV FLUSH  18 13:30


     


 


 


  (Mylicon Chew)  80 mg  QID  PRN


 PO  18 13:30


     


 


 


  (Motrin)  600 mg  Q6H  PRN


 PO  18 13:30


    18 02:28


 


 


  (Percocet  5-325


 Mg)  1 tab  Q4H  PRN


 PO  18 13:30


    18 13:13


 


 


  (Percocet  5-325


 Mg)  2 tab  Q4H  PRN


 PO  18 13:30


    18 20:57


 











Assessment/Plan


Problem List:  


(1)  delivery delivered


ICD Codes:  O82 - Encounter for  delivery without indication


Plan:  [28] yo  female POD # 1. 


- AFVSS, Hgb 11.3. Asymptomatic. Vitals stable. 


- Continue routine postpartum care 


- Motrin and Percocet PRN pain 


- Encourage OOB 


-Incision check one week after discharge


- Pelvic rest x 6 wks  


- Contraception: Tubal ligation, is aware of pelvic rest for 6 weeks. 


- Anticipate D/C 1-2 days. 





(2) Encounter for female sterilization procedure


ICD Codes:  Z30.2 - Encounter for sterilization











Ge Seaman MD 2018 07:43

## 2018-02-07 NOTE — HHI.DCPOC
Discharge Care Plan


Diagnosis:  


(1)  deliv due to previous difficult deliv, deliv, curr hospitaliz


Report Symptoms to Your Doctor


-Temperature above 100.5 degrees


-Redness, of incision or excessive or foul smelling drainage


-Unusual pain or calf pain


-Increased vaginal bleeding


-Painful or difficulty urinating


-Feelings of extreme sadness or anxiety after 2 weeks


Goals to Promote Your Health


* To prevent worsening of your condition and complications


* To maintain your health at the optimal level


Directions to Meet Your Goals


*** Take your medications as prescribed


*** Follow your dietary instruction


*** Follow activity as directed


*** Ensure plenty of rest for recovery


*** Drink fluids for hydration








*** Keep your appointments as scheduled


*** Take your immunizations and boosters as scheduled


*** If your symptoms worsen call your PCP, if no PCP go to Urgent Care Center 

or Emergency Room***


*** Smoking is Dangerous to Your Health. Avoid second hand smoke***


***Call the 24-hour crisis hotline for domestic abuse at 1-202.282.4566***











Roberto Robles MD, R3 2018 09:31

## 2018-02-07 NOTE — HHI.OB
Subjective


Post Operative Day:  2


Remarks


Doing well today.  Breast-feeding.  Ambulating and voiding without difficulty.  

She has been cleared for discharge by obstetrician.  No fever, chills, chest 

pain, shortness of breath.





Objective


Vitals/I&O





Vital Signs








  Date Time  Temp Pulse Resp B/P (MAP) Pulse Ox O2 Delivery O2 Flow Rate FiO2


 


18 08:00 98.1 80 18 114/64 (81)    


 


18 20:00  83 18 123/63 (83) 97   


 


18 20:00 98.3       


 


18 16:28  82 16     


 


18 16:28 98.0       


 


18 12:00 98.1 88 16 139/71 (93) 98   








Result Diagram:  


18 0533





Objective Remarks


GENERAL: Well-nourished, well-developed patient.


CARDIOVASCULAR: Regular rate and rhythm without murmurs, gallops, or rubs. 


RESPIRATORY: Breath sounds equal bilaterally. No accessory muscle use.


ABDOMEN/GI: Abdomen soft, non-tender, bowel sounds present. 


   Incision: Clean, dry and intact.


   Fundus: Firm, non-tender at umbilicus.


GENITOURINARY: Light to moderate bleeding.


EXTREMITIES: No cyanosis or edema, non-tender, without signs of DVT.


Medications and IVs





Current Medications








 Medications


  (Trade)  Dose


 Ordered  Sig/Luba


 Route  Start Time


 Stop Time Status Last Admin


 


 Lactated Ringer's  1,000 ml @ 


 150 mls/hr  Q6H40M


 IV  18 11:41


    18 12:10


 


 


  (Bicitra Liq)  30 ml  ON  CALL


 PO  18 12:45


 18 12:44  18 12:10


 


 


 Clindamycin


 Phosphate 600 mg/


 Sodium Chloride  104 ml @ 


 200 mls/hr  ON  CALL


 IV  18 12:15


 18 12:14   


 


 


  (NS Flush)  2 ml  BID


 IV FLUSH  18 21:00


     


 


 


  (NS Flush)  2 ml  UNSCH  PRN


 IV FLUSH  18 13:30


     


 


 


  (Mylicon Chew)  80 mg  QID  PRN


 PO  18 13:30


     


 


 


  (Motrin)  600 mg  Q6H  PRN


 PO  18 13:30


    18 08:47


 


 


  (Percocet  5-325


 Mg)  1 tab  Q4H  PRN


 PO  18 13:30


    18 08:47


 


 


  (Percocet  5-325


 Mg)  2 tab  Q4H  PRN


 PO  18 13:30


    18 20:57


 











Assessment/Plan


Problem List:  


(1)  delivery delivered


ICD Codes:  O82 - Encounter for  delivery without indication


Status:  Acute


Plan:  [28] yo  female POD # 2. 


- AFVSS, Hgb 11.3. Asymptomatic. Vitals stable. 


- Continue routine postpartum care 


- Motrin and Percocet PRN pain 


- Encourage OOB 


- Incision check one week after discharge


- Pelvic rest x 6 wks  


- Contraception: Tubal ligation, is aware of pelvic rest for 6 weeks. 


-GDM: continue metformin for now; re-eval in office.


- d/c today and follow up with me in 1 week





(2) Encounter for female sterilization procedure


ICD Codes:  Z30.2 - Encounter for sterilization


Status:  Acute











Roberto Robles MD, R3 2018 11:42

## 2023-05-17 ENCOUNTER — APPOINTMENT (EMERGENCY)
Dept: CT IMAGING | Facility: HOSPITAL | Age: 33
End: 2023-05-17

## 2023-05-17 ENCOUNTER — HOSPITAL ENCOUNTER (EMERGENCY)
Facility: HOSPITAL | Age: 33
Discharge: HOME | End: 2023-05-18
Attending: EMERGENCY MEDICINE

## 2023-05-17 VITALS
SYSTOLIC BLOOD PRESSURE: 150 MMHG | BODY MASS INDEX: 43.4 KG/M2 | OXYGEN SATURATION: 99 % | HEIGHT: 69 IN | DIASTOLIC BLOOD PRESSURE: 79 MMHG | TEMPERATURE: 98 F | HEART RATE: 98 BPM | RESPIRATION RATE: 18 BRPM | WEIGHT: 293 LBS

## 2023-05-17 DIAGNOSIS — R00.2 PALPITATIONS: ICD-10-CM

## 2023-05-17 DIAGNOSIS — R10.31 RIGHT LOWER QUADRANT ABDOMINAL PAIN: Primary | ICD-10-CM

## 2023-05-17 DIAGNOSIS — N39.0 UTI (URINARY TRACT INFECTION): ICD-10-CM

## 2023-05-17 LAB
ALBUMIN SERPL BCP-MCNC: 3.9 G/DL (ref 3.5–5)
ALP SERPL-CCNC: 51 U/L (ref 34–104)
ALT SERPL W P-5'-P-CCNC: 27 U/L (ref 7–52)
ANION GAP SERPL CALCULATED.3IONS-SCNC: 7 MMOL/L (ref 4–13)
AST SERPL W P-5'-P-CCNC: 22 U/L (ref 13–39)
BACTERIA UR QL AUTO: ABNORMAL /HPF
BASOPHILS # BLD AUTO: 0.05 THOUSANDS/ÂΜL (ref 0–0.1)
BASOPHILS NFR BLD AUTO: 1 % (ref 0–1)
BILIRUB SERPL-MCNC: 0.67 MG/DL (ref 0.2–1)
BILIRUB UR QL STRIP: NEGATIVE
BUN SERPL-MCNC: 13 MG/DL (ref 5–25)
CALCIUM SERPL-MCNC: 9 MG/DL (ref 8.4–10.2)
CHLORIDE SERPL-SCNC: 102 MMOL/L (ref 96–108)
CLARITY UR: ABNORMAL
CO2 SERPL-SCNC: 25 MMOL/L (ref 21–32)
COLOR UR: YELLOW
CREAT SERPL-MCNC: 0.69 MG/DL (ref 0.6–1.3)
EOSINOPHIL # BLD AUTO: 0.34 THOUSAND/ÂΜL (ref 0–0.61)
EOSINOPHIL NFR BLD AUTO: 4 % (ref 0–6)
ERYTHROCYTE [DISTWIDTH] IN BLOOD BY AUTOMATED COUNT: 12.7 % (ref 11.6–15.1)
EXT PREGNANCY TEST URINE: NEGATIVE
EXT. CONTROL: NORMAL
GFR SERPL CREATININE-BSD FRML MDRD: 113 ML/MIN/1.73SQ M
GLUCOSE SERPL-MCNC: 94 MG/DL (ref 65–140)
GLUCOSE UR STRIP-MCNC: NEGATIVE MG/DL
HCT VFR BLD AUTO: 42.7 % (ref 34.8–46.1)
HGB BLD-MCNC: 14.3 G/DL (ref 11.5–15.4)
HGB UR QL STRIP.AUTO: ABNORMAL
HOLD SPECIMEN: NORMAL
IMM GRANULOCYTES # BLD AUTO: 0.05 THOUSAND/UL (ref 0–0.2)
IMM GRANULOCYTES NFR BLD AUTO: 1 % (ref 0–2)
KETONES UR STRIP-MCNC: NEGATIVE MG/DL
LEUKOCYTE ESTERASE UR QL STRIP: ABNORMAL
LIPASE SERPL-CCNC: 9 U/L (ref 11–82)
LYMPHOCYTES # BLD AUTO: 1.89 THOUSANDS/ÂΜL (ref 0.6–4.47)
LYMPHOCYTES NFR BLD AUTO: 22 % (ref 14–44)
MCH RBC QN AUTO: 30.4 PG (ref 26.8–34.3)
MCHC RBC AUTO-ENTMCNC: 33.5 G/DL (ref 31.4–37.4)
MCV RBC AUTO: 91 FL (ref 82–98)
MONOCYTES # BLD AUTO: 0.43 THOUSAND/ÂΜL (ref 0.17–1.22)
MONOCYTES NFR BLD AUTO: 5 % (ref 4–12)
MUCOUS THREADS UR QL AUTO: ABNORMAL
NEUTROPHILS # BLD AUTO: 6.04 THOUSANDS/ÂΜL (ref 1.85–7.62)
NEUTS SEG NFR BLD AUTO: 67 % (ref 43–75)
NITRITE UR QL STRIP: POSITIVE
NON-SQ EPI CELLS URNS QL MICRO: ABNORMAL /HPF
NRBC BLD AUTO-RTO: 0 /100 WBCS
PH UR STRIP.AUTO: 5.5 [PH]
PLATELET # BLD AUTO: 270 THOUSANDS/UL (ref 149–390)
PMV BLD AUTO: 9.8 FL (ref 8.9–12.7)
POTASSIUM SERPL-SCNC: 3.7 MMOL/L (ref 3.5–5.3)
PROT SERPL-MCNC: 7 G/DL (ref 6.4–8.4)
PROT UR STRIP-MCNC: ABNORMAL MG/DL
RBC # BLD AUTO: 4.7 MILLION/UL (ref 3.81–5.12)
RBC #/AREA URNS AUTO: ABNORMAL /HPF
SODIUM SERPL-SCNC: 134 MMOL/L (ref 135–147)
SP GR UR STRIP.AUTO: 1.02 (ref 1–1.03)
UROBILINOGEN UR QL STRIP.AUTO: 0.2 E.U./DL
WBC # BLD AUTO: 8.8 THOUSAND/UL (ref 4.31–10.16)
WBC #/AREA URNS AUTO: ABNORMAL /HPF

## 2023-05-17 RX ORDER — KETOROLAC TROMETHAMINE 30 MG/ML
30 INJECTION, SOLUTION INTRAMUSCULAR; INTRAVENOUS ONCE
Status: COMPLETED | OUTPATIENT
Start: 2023-05-17 | End: 2023-05-17

## 2023-05-17 RX ORDER — NITROFURANTOIN 25; 75 MG/1; MG/1
100 CAPSULE ORAL ONCE
Status: COMPLETED | OUTPATIENT
Start: 2023-05-18 | End: 2023-05-18

## 2023-05-17 RX ORDER — ONDANSETRON 2 MG/ML
4 INJECTION INTRAMUSCULAR; INTRAVENOUS ONCE
Status: COMPLETED | OUTPATIENT
Start: 2023-05-17 | End: 2023-05-17

## 2023-05-17 RX ADMIN — ONDANSETRON 4 MG: 2 INJECTION INTRAMUSCULAR; INTRAVENOUS at 21:36

## 2023-05-17 RX ADMIN — KETOROLAC TROMETHAMINE 30 MG: 30 INJECTION, SOLUTION INTRAMUSCULAR; INTRAVENOUS at 21:36

## 2023-05-17 RX ADMIN — SODIUM CHLORIDE 1000 ML: 0.9 INJECTION, SOLUTION INTRAVENOUS at 21:36

## 2023-05-17 RX ADMIN — IOHEXOL 100 ML: 350 INJECTION, SOLUTION INTRAVENOUS at 21:49

## 2023-05-18 RX ORDER — NITROFURANTOIN 25; 75 MG/1; MG/1
100 CAPSULE ORAL 2 TIMES DAILY
Qty: 10 CAPSULE | Refills: 0 | Status: SHIPPED | OUTPATIENT
Start: 2023-05-17

## 2023-05-18 RX ADMIN — NITROFURANTOIN (MONOHYDRATE/MACROCRYSTALS) 100 MG: 75; 25 CAPSULE ORAL at 00:06

## 2023-05-18 NOTE — DISCHARGE INSTRUCTIONS
Thank you for visiting the Emergency Department today  No acute findings or surgical problems seen on CT scan  Labs normal    Urine shows UTI, will treat with antibiotic called macrobid  Rx sent to pharmacy  Return if symptoms worsen  No arrhythmia seen on cardiac monitor or EKG

## 2023-05-18 NOTE — ED PROVIDER NOTES
History  Chief Complaint   Patient presents with   • Medical Problem     Patient has multiple issues going on over a year,  states numbness in arms for over year off and on,  today c/o worsening lower abdominal pain  And started with Nausea earlier today     HPI  40-year-old female presenting to the emergency room for evaluation of right lower quadrant abdominal pain which began today gradually at rest   Patient denies a history of similar symptoms in the past   She feels like the pain is worse with movement and palpation  It is associate with nausea and vomiting  She reports that the nausea and vomiting have improved somewhat  Patient also reports that she has been having intermittent flushing/fluttering sensation in her anterior chest which comes and goes and was present yesterday as well as today  She reports these issues occurred today at work and she was advised to seek evaluation for this  Patient denies any syncopal episodes  Denies any focal weakness denies history of similar symptoms in the past   Denies any significant new changes in health  Denies fevers or chills  Past medical history significant for morbid obesity, depression, history of cholecystectomy, psoriasis  Denies other abdominal surgical history  None       Past Medical History:   Diagnosis Date   • Arthritis    • Psychiatric disorder        History reviewed  No pertinent surgical history  History reviewed  No pertinent family history  I have reviewed and agree with the history as documented  E-Cigarette/Vaping   • E-Cigarette Use Never User      E-Cigarette/Vaping Substances     Social History     Tobacco Use   • Smoking status: Former     Types: Cigarettes   • Smokeless tobacco: Never   Vaping Use   • Vaping Use: Never used   Substance Use Topics   • Alcohol use: Not Currently   • Drug use: Never       Review of Systems   Constitutional: Positive for activity change, appetite change and fatigue   Negative for chills and fever    HENT: Positive for congestion  Negative for ear pain and sore throat  Eyes: Negative for pain and visual disturbance  Respiratory: Negative for cough and shortness of breath  Cardiovascular: Positive for chest pain and palpitations  Gastrointestinal: Positive for abdominal pain, diarrhea, nausea and vomiting  Genitourinary: Positive for difficulty urinating and flank pain  Negative for dysuria and hematuria  Musculoskeletal: Negative for arthralgias and back pain  Skin: Negative for color change and rash  Neurological: Negative for seizures and syncope  All other systems reviewed and are negative  Physical Exam  Physical Exam  Vitals and nursing note reviewed  Exam conducted with a chaperone present  Constitutional:       General: She is not in acute distress  Appearance: Normal appearance  She is well-developed  She is morbidly obese  She is not ill-appearing, toxic-appearing or diaphoretic  HENT:      Head: Normocephalic and atraumatic  Right Ear: External ear normal       Left Ear: External ear normal       Nose: Nose normal       Mouth/Throat:      Mouth: Mucous membranes are moist       Pharynx: Oropharynx is clear  Eyes:      General: No scleral icterus  Conjunctiva/sclera: Conjunctivae normal    Cardiovascular:      Rate and Rhythm: Normal rate and regular rhythm  Pulses: Normal pulses  Heart sounds: Normal heart sounds  No murmur heard  Pulmonary:      Effort: Pulmonary effort is normal  No respiratory distress  Breath sounds: Normal breath sounds  Abdominal:      General: Abdomen is protuberant  Palpations: Abdomen is soft  Tenderness: There is abdominal tenderness  There is no right CVA tenderness, left CVA tenderness, guarding or rebound  Hernia: No hernia is present  Musculoskeletal:         General: No swelling  Cervical back: Neck supple  Right lower leg: No edema  Left lower leg: No edema  Skin:     General: Skin is warm and dry  Capillary Refill: Capillary refill takes less than 2 seconds  Coloration: Skin is not jaundiced or pale  Neurological:      General: No focal deficit present  Mental Status: She is alert  Mental status is at baseline  Psychiatric:         Mood and Affect: Mood normal          Vital Signs  ED Triage Vitals [05/17/23 1855]   Temperature Pulse Respirations Blood Pressure SpO2   98 °F (36 7 °C) 98 18 150/79 99 %      Temp Source Heart Rate Source Patient Position - Orthostatic VS BP Location FiO2 (%)   Tympanic Monitor Sitting Right arm --      Pain Score       2           Vitals:    05/17/23 1855   BP: 150/79   Pulse: 98   Patient Position - Orthostatic VS: Sitting         Visual Acuity      ED Medications  Medications   sodium chloride 0 9 % bolus 1,000 mL (0 mL Intravenous Stopped 5/17/23 2252)   ondansetron (ZOFRAN) injection 4 mg (4 mg Intravenous Given 5/17/23 2136)   ketorolac (TORADOL) injection 30 mg (30 mg Intravenous Given 5/17/23 2136)   iohexol (OMNIPAQUE) 350 MG/ML injection (SINGLE-DOSE) 100 mL (100 mL Intravenous Given 5/17/23 2149)   nitrofurantoin (MACROBID) extended-release capsule 100 mg (100 mg Oral Given 5/18/23 0006)       Diagnostic Studies  Results Reviewed     Procedure Component Value Units Date/Time    [facility]ays draw [[id number]] Collected: 05/17/23 2013    Lab Status: Final result Specimen: Blood from Arm, Left Updated: 05/17/23 2202    Narrative: The following orders were created for panel order Mays draw  Procedure                               Abnormality         Status                     ---------                               -----------         ------                     Xavier Laity Top on HODK[[id number]]                           Final result               Green / Black tube on GBKQ[[id number]]                       Final result                 Please view results for these tests on the individual orders      Urine Microscopic [[id number]]  (Abnormal) Collected: 05/17/23 2131    Lab Status: Final result Specimen: Urine, Clean Catch Updated: 05/17/23 2153     RBC, UA 2-4 /hpf      WBC, UA 4-10 /hpf      Epithelial Cells Occasional /hpf      Bacteria, UA Occasional /hpf      MUCUS THREADS Occasional    UA w Reflex to Microscopic w Reflex to Culture [[id number]]  (Abnormal) Collected: 05/17/23 2131    Lab Status: Final result Specimen: Urine, Clean Catch Updated: 05/17/23 2138     Color, UA Yellow     Clarity, UA Slightly Cloudy     Specific Gravity, UA 1 025     pH, UA 5 5     Leukocytes, UA Trace     Nitrite, UA Positive     Protein, UA Trace mg/dl      Glucose, UA Negative mg/dl      Ketones, UA Negative mg/dl      Urobilinogen, UA 0 2 E U /dl      Bilirubin, UA Negative     Occult Blood, UA Trace-Intact    POCT pregnancy, urine [[id number]]  (Normal) Resulted: 05/17/23 2135    Lab Status: Final result Updated: 05/17/23 2136     EXT Preg Test, Ur Negative     Control Valid    Comprehensive metabolic panel [[id number]]  (Abnormal) Collected: 05/17/23 2013    Lab Status: Final result Specimen: Blood from Arm, Left Updated: 05/17/23 2042     Sodium 134 mmol/L      Potassium 3 7 mmol/L      Chloride 102 mmol/L      CO2 25 mmol/L      ANION GAP 7 mmol/L      BUN 13 mg/dL      Creatinine 0 69 mg/dL      Glucose 94 mg/dL      Calcium 9 0 mg/dL      AST 22 U/L      ALT 27 U/L      Alkaline Phosphatase 51 U/L      Total Protein 7 0 g/dL      Albumin 3 9 g/dL      Total Bilirubin 0 67 mg/dL      eGFR 113 ml/min/1 73sq m     Narrative:      Sal guidelines for Chronic Kidney Disease (CKD):   •  Stage 1 with normal or high GFR (GFR > 90 mL/min/1 73 square meters)  •  Stage 2 Mild CKD (GFR = 60-89 mL/min/1 73 square meters)  •  Stage 3A Moderate CKD (GFR = 45-59 mL/min/1 73 square meters)  •  Stage 3B Moderate CKD (GFR = 30-44 mL/min/1 73 square meters)  •  Stage 4 Severe CKD (GFR = 15-29 mL/min/1 73 square meters)  •  Stage 5 End Stage CKD (GFR <15 mL/min/1 73 square meters)  Note: GFR calculation is accurate only with a steady state creatinine    Lipase [[id number]]  (Abnormal) Collected: 05/17/23 2013    Lab Status: Final result Specimen: Blood from Arm, Left Updated: 05/17/23 2042     Lipase 9 u/L     CBC and differential [[id number]] Collected: 05/17/23 2013    Lab Status: Final result Specimen: Blood from Arm, Left Updated: 05/17/23 2026     WBC 8 80 Thousand/uL      RBC 4 70 Million/uL      Hemoglobin 14 3 g/dL      Hematocrit 42 7 %      MCV 91 fL      MCH 30 4 pg      MCHC 33 5 g/dL      RDW 12 7 %      MPV 9 8 fL      Platelets 257 Thousands/uL      nRBC 0 /100 WBCs      Neutrophils Relative 67 %      Immat GRANS % 1 %      Lymphocytes Relative 22 %      Monocytes Relative 5 %      Eosinophils Relative 4 %      Basophils Relative 1 %      Neutrophils Absolute 6 04 Thousands/µL      Immature Grans Absolute 0 05 Thousand/uL      Lymphocytes Absolute 1 89 Thousands/µL      Monocytes Absolute 0 43 Thousand/µL      Eosinophils Absolute 0 34 Thousand/µL      Basophils Absolute 0 05 Thousands/µL                  CT abdomen pelvis with contrast   Final Result by Karen Crockett MD (05/17 2348)         3 mm nonobstructing stone in the lower pole the left kidney  No hydronephrosis  Workstation performed: [id number]                    Procedures  Procedures         ED Course  ED Course as of 05/18/23 0028   Wed May 17, 2023   2302 Nitrite, UA(!): Positive   2303 Leukocytes, UA(!): Trace   2303 Bacteria, UA: Occasional   2303 WBC, UA(!): 4-10   2305 EKG interpreted by myself  EKG did not 5/17/2023 2020 demonstrates normal sinus rhythm at 83 bpm, normal [place] interval, normal QRS interval, normal QTc interval, no STEMI  Medical Decision Making  39-year-old female presenting for multiple complaints  Most concerned about new onset RLQ pain   No paresthesias reported to me description of symptoms most consistent with palpitations  Symptoms c/w GI illness  Will check labs, UA, CT, provide symptomatic tx, anticipate d/c  Palpitations: self-limited or minor problem  Right lower quadrant abdominal pain: acute illness or injury  UTI (urinary tract infection): acute illness or injury  Amount and/or Complexity of Data Reviewed  Labs: ordered  Decision-making details documented in ED Course  Radiology: ordered  ECG/medicine tests: ordered and independent interpretation performed  Decision-making details documented in ED Course  Risk  Prescription drug management  Disposition  Final diagnoses:   Right lower quadrant abdominal pain   UTI (urinary tract infection)   Palpitations     Time reflects when diagnosis was documented in both MDM as applicable and the Disposition within this note     Time User Action Codes Description Comment    5/17/2023 11:58 PM Wanna Caul Add [R10 31] Right lower quadrant abdominal pain     5/17/2023 11:58 PM Wanna Caul Add [N39 0] UTI (urinary tract infection)     5/17/2023 11:58 PM Wanna Caul Add [R00 2] Palpitations       ED Disposition     ED Disposition   Discharge    Condition   Stable    Date/Time   Wed May 17, 2023 11:58 PM    Comment   Cory Patterson discharge to home/self care                 Follow-up Information     Follow up With Specialties Details Why Contact Info Additional Information    Kamini Grubbs MD Family Medicine Schedule an appointment as soon as possible for a visit   [address]AN[phone] [phone] [facility] Emergency Department Emergency Medicine Go to  If symptoms worsen [facility] [phone]  [address] [place] [address] Emergency Department, [address], [address]          Discharge Medication List as of 5/18/2023 12:04 AM      START taking these medications    Details   nitrofurantoin (MACROBID) 100 mg capsule Take 1 capsule (100 mg total) by mouth 2 (two) times a day, Starting Wed 5/17/2023, Normal             No discharge procedures on file      PDMP Review     None          ED Provider  Electronically Signed by           Yolis Smith DO  05/18/23 0028

## 2023-05-19 LAB
ATRIAL RATE: 83 BPM
P AXIS: 44 DEGREES
PR INTERVAL: 124 MS
QRS AXIS: 60 DEGREES
QRSD INTERVAL: 90 MS
QT INTERVAL: 376 MS
QTC INTERVAL: 441 MS
T WAVE AXIS: 59 DEGREES
VENTRICULAR RATE: 83 BPM

## 2024-03-19 ENCOUNTER — APPOINTMENT (EMERGENCY)
Dept: CT IMAGING | Facility: HOSPITAL | Age: 34
End: 2024-03-19
Payer: COMMERCIAL

## 2024-03-19 ENCOUNTER — HOSPITAL ENCOUNTER (EMERGENCY)
Facility: HOSPITAL | Age: 34
Discharge: HOME | End: 2024-03-19
Attending: EMERGENCY MEDICINE
Payer: COMMERCIAL

## 2024-03-19 VITALS
DIASTOLIC BLOOD PRESSURE: 80 MMHG | TEMPERATURE: 98.1 F | BODY MASS INDEX: 41.94 KG/M2 | HEART RATE: 86 BPM | OXYGEN SATURATION: 96 % | RESPIRATION RATE: 18 BRPM | SYSTOLIC BLOOD PRESSURE: 135 MMHG | WEIGHT: 284 LBS

## 2024-03-19 DIAGNOSIS — E83.42 HYPOMAGNESEMIA: Primary | ICD-10-CM

## 2024-03-19 DIAGNOSIS — K52.9 GASTROENTERITIS: ICD-10-CM

## 2024-03-19 DIAGNOSIS — D72.829 LEUKOCYTOSIS: ICD-10-CM

## 2024-03-19 LAB
ALBUMIN SERPL BCP-MCNC: 4.1 G/DL (ref 3.5–5)
ALP SERPL-CCNC: 59 U/L (ref 34–104)
ALT SERPL W P-5'-P-CCNC: 22 U/L (ref 7–52)
ANION GAP SERPL CALCULATED.3IONS-SCNC: 11 MMOL/L (ref 4–13)
AST SERPL W P-5'-P-CCNC: 17 U/L (ref 13–39)
BASOPHILS # BLD AUTO: 0.11 THOUSANDS/ÂΜL (ref 0–0.1)
BASOPHILS NFR BLD AUTO: 0 % (ref 0–1)
BILIRUB SERPL-MCNC: 0.63 MG/DL (ref 0.2–1)
BILIRUB UR QL STRIP: ABNORMAL
BUN SERPL-MCNC: 16 MG/DL (ref 5–25)
CALCIUM SERPL-MCNC: 9.5 MG/DL (ref 8.4–10.2)
CARDIAC TROPONIN I PNL SERPL HS: 5 NG/L
CHLORIDE SERPL-SCNC: 102 MMOL/L (ref 96–108)
CLARITY UR: CLEAR
CO2 SERPL-SCNC: 21 MMOL/L (ref 21–32)
COLOR UR: YELLOW
CREAT SERPL-MCNC: 0.92 MG/DL (ref 0.6–1.3)
EOSINOPHIL # BLD AUTO: 0.45 THOUSAND/ÂΜL (ref 0–0.61)
EOSINOPHIL NFR BLD AUTO: 2 % (ref 0–6)
ERYTHROCYTE [DISTWIDTH] IN BLOOD BY AUTOMATED COUNT: 12.9 % (ref 11.6–15.1)
FLUAV RNA RESP QL NAA+PROBE: NEGATIVE
FLUBV RNA RESP QL NAA+PROBE: NEGATIVE
GFR SERPL CREATININE-BSD FRML MDRD: 80 ML/MIN/1.73SQ M
GLUCOSE SERPL-MCNC: 144 MG/DL (ref 65–140)
GLUCOSE UR STRIP-MCNC: NEGATIVE MG/DL
HCT VFR BLD AUTO: 54.9 % (ref 34.8–46.1)
HGB BLD-MCNC: 18.5 G/DL (ref 11.5–15.4)
HGB UR QL STRIP.AUTO: NEGATIVE
IMM GRANULOCYTES # BLD AUTO: 0.24 THOUSAND/UL (ref 0–0.2)
IMM GRANULOCYTES NFR BLD AUTO: 1 % (ref 0–2)
KETONES UR STRIP-MCNC: ABNORMAL MG/DL
LACTATE SERPL-SCNC: 1.4 MMOL/L (ref 0.5–2)
LEUKOCYTE ESTERASE UR QL STRIP: NEGATIVE
LIPASE SERPL-CCNC: 6 U/L (ref 11–82)
LYMPHOCYTES # BLD AUTO: 2.52 THOUSANDS/ÂΜL (ref 0.6–4.47)
LYMPHOCYTES NFR BLD AUTO: 8 % (ref 14–44)
MAGNESIUM SERPL-MCNC: 1.6 MG/DL (ref 1.9–2.7)
MCH RBC QN AUTO: 30.6 PG (ref 26.8–34.3)
MCHC RBC AUTO-ENTMCNC: 33.7 G/DL (ref 31.4–37.4)
MCV RBC AUTO: 91 FL (ref 82–98)
MONOCYTES # BLD AUTO: 0.76 THOUSAND/ÂΜL (ref 0.17–1.22)
MONOCYTES NFR BLD AUTO: 3 % (ref 4–12)
NEUTROPHILS # BLD AUTO: 26.31 THOUSANDS/ÂΜL (ref 1.85–7.62)
NEUTS SEG NFR BLD AUTO: 86 % (ref 43–75)
NITRITE UR QL STRIP: NEGATIVE
NRBC BLD AUTO-RTO: 0 /100 WBCS
PH UR STRIP.AUTO: 6.5 [PH]
PLATELET # BLD AUTO: 383 THOUSANDS/UL (ref 149–390)
PMV BLD AUTO: 10.6 FL (ref 8.9–12.7)
POTASSIUM SERPL-SCNC: 4.4 MMOL/L (ref 3.5–5.3)
PROT SERPL-MCNC: 7.2 G/DL (ref 6.4–8.4)
PROT UR STRIP-MCNC: NEGATIVE MG/DL
RBC # BLD AUTO: 6.04 MILLION/UL (ref 3.81–5.12)
RSV RNA RESP QL NAA+PROBE: NEGATIVE
SARS-COV-2 RNA RESP QL NAA+PROBE: NEGATIVE
SODIUM SERPL-SCNC: 134 MMOL/L (ref 135–147)
SP GR UR STRIP.AUTO: <=1.005 (ref 1–1.03)
UROBILINOGEN UR QL STRIP.AUTO: 0.2 E.U./DL
WBC # BLD AUTO: 30.39 THOUSAND/UL (ref 4.31–10.16)

## 2024-03-19 PROCEDURE — 84484 ASSAY OF TROPONIN QUANT: CPT | Performed by: PHYSICIAN ASSISTANT

## 2024-03-19 PROCEDURE — 96375 TX/PRO/DX INJ NEW DRUG ADDON: CPT

## 2024-03-19 PROCEDURE — 83605 ASSAY OF LACTIC ACID: CPT | Performed by: PHYSICIAN ASSISTANT

## 2024-03-19 PROCEDURE — 0241U HB NFCT DS VIR RESP RNA 4 TRGT: CPT | Performed by: PHYSICIAN ASSISTANT

## 2024-03-19 PROCEDURE — 87040 BLOOD CULTURE FOR BACTERIA: CPT | Performed by: PHYSICIAN ASSISTANT

## 2024-03-19 PROCEDURE — 83735 ASSAY OF MAGNESIUM: CPT | Performed by: PHYSICIAN ASSISTANT

## 2024-03-19 PROCEDURE — 81003 URINALYSIS AUTO W/O SCOPE: CPT | Performed by: PHYSICIAN ASSISTANT

## 2024-03-19 PROCEDURE — 80053 COMPREHEN METABOLIC PANEL: CPT | Performed by: PHYSICIAN ASSISTANT

## 2024-03-19 PROCEDURE — 36415 COLL VENOUS BLD VENIPUNCTURE: CPT | Performed by: PHYSICIAN ASSISTANT

## 2024-03-19 PROCEDURE — 85025 COMPLETE CBC W/AUTO DIFF WBC: CPT | Performed by: PHYSICIAN ASSISTANT

## 2024-03-19 PROCEDURE — 83690 ASSAY OF LIPASE: CPT | Performed by: PHYSICIAN ASSISTANT

## 2024-03-19 PROCEDURE — 99285 EMERGENCY DEPT VISIT HI MDM: CPT | Performed by: PHYSICIAN ASSISTANT

## 2024-03-19 PROCEDURE — 93005 ELECTROCARDIOGRAM TRACING: CPT

## 2024-03-19 PROCEDURE — 96365 THER/PROPH/DIAG IV INF INIT: CPT

## 2024-03-19 PROCEDURE — 74177 CT ABD & PELVIS W/CONTRAST: CPT

## 2024-03-19 PROCEDURE — 96361 HYDRATE IV INFUSION ADD-ON: CPT

## 2024-03-19 PROCEDURE — 96366 THER/PROPH/DIAG IV INF ADDON: CPT

## 2024-03-19 PROCEDURE — 99284 EMERGENCY DEPT VISIT MOD MDM: CPT

## 2024-03-19 RX ORDER — DICYCLOMINE HCL 20 MG
20 TABLET ORAL 2 TIMES DAILY
Qty: 20 TABLET | Refills: 0 | Status: SHIPPED | OUTPATIENT
Start: 2024-03-19

## 2024-03-19 RX ORDER — ONDANSETRON 2 MG/ML
4 INJECTION INTRAMUSCULAR; INTRAVENOUS ONCE
Status: COMPLETED | OUTPATIENT
Start: 2024-03-19 | End: 2024-03-19

## 2024-03-19 RX ORDER — MAGNESIUM SULFATE HEPTAHYDRATE 40 MG/ML
2 INJECTION, SOLUTION INTRAVENOUS ONCE
Status: COMPLETED | OUTPATIENT
Start: 2024-03-19 | End: 2024-03-19

## 2024-03-19 RX ADMIN — IOHEXOL 100 ML: 350 INJECTION, SOLUTION INTRAVENOUS at 17:39

## 2024-03-19 RX ADMIN — MAGNESIUM SULFATE HEPTAHYDRATE 2 G: 40 INJECTION, SOLUTION INTRAVENOUS at 18:19

## 2024-03-19 RX ADMIN — SODIUM CHLORIDE 1000 ML: 0.9 INJECTION, SOLUTION INTRAVENOUS at 20:23

## 2024-03-19 RX ADMIN — SODIUM CHLORIDE 1000 ML: 0.9 INJECTION, SOLUTION INTRAVENOUS at 17:47

## 2024-03-19 RX ADMIN — ONDANSETRON 4 MG: 2 INJECTION INTRAMUSCULAR; INTRAVENOUS at 17:48

## 2024-03-19 NOTE — ED PROVIDER NOTES
History  Chief Complaint   Patient presents with    Abdominal Pain     Patient reports ongoing abdominal pain. Patient vomited earlier today, was seen at [facility] earlier today.      This is a 34-year-old female presenting to the emergency department today for evaluation of abdominal pain vomiting diarrhea.  She presents via private vehicle.  States that symptoms began 4 days ago.  Initially began with diarrhea that is watery brown denies black or red contents.  She states she has been unable to keep down any food or liquids.    Denies chest pain or shortness of breath.  Upon review of records she was seen at the [facility] emergency department today she was brought there via EMS for the above listed complaints she had a workup which was significant for UTI.  Discharged on Bactrim and Zofran.  Discharged home.  Patient states that she called her bariatric provider who stated she needs a second opinion and to go somewhere else.  She presented here.  She states she has not had bariatric surgery however she is going through the process.    Past surgical history positive for tubal ligations in the past subsequent hysterectomy also cholecystectomy.        Prior to Admission Medications   Prescriptions Last Dose Informant Patient Reported? Taking?   nitrofurantoin (MACROBID) 100 mg capsule   No No   Sig: Take 1 capsule (100 mg total) by mouth 2 (two) times a day      Facility-Administered Medications: None       Past Medical History:   Diagnosis Date    Arthritis     Psychiatric disorder        Past Surgical History:   Procedure Laterality Date     SECTION      3 times    CHOLECYSTECTOMY      HYSTERECTOMY         History reviewed. No pertinent family history.  I have reviewed and agree with the history as documented.    E-Cigarette/Vaping    E-Cigarette Use Never User      E-Cigarette/Vaping Substances     Social History     Tobacco Use    Smoking status: Every Day     Current packs/day: 0.25     Types:  Cigarettes    Smokeless tobacco: Never   Vaping Use    Vaping status: Never Used   Substance Use Topics    Alcohol use: Not Currently    Drug use: Never       Review of Systems   Constitutional:  Negative for chills and fever.   HENT:  Negative for ear pain and sore throat.    Eyes:  Negative for pain and visual disturbance.   Respiratory:  Negative for cough and shortness of breath.    Cardiovascular:  Negative for chest pain and palpitations.   Gastrointestinal:  Positive for abdominal pain, diarrhea, nausea and vomiting.   Genitourinary:  Negative for dysuria and hematuria.   Musculoskeletal:  Negative for arthralgias and back pain.   Skin:  Negative for color change and rash.   Neurological:  Negative for seizures and syncope.   All other systems reviewed and are negative.      Physical Exam  Physical Exam  Constitutional:       General: She is not in acute distress.     Appearance: She is well-developed. She is not ill-appearing, toxic-appearing or diaphoretic.   HENT:      Right Ear: External ear normal. No swelling. Tympanic membrane is not bulging.      Left Ear: External ear normal. No swelling. Tympanic membrane is not bulging.      Nose: Nose normal.      Mouth/Throat:      Pharynx: No oropharyngeal exudate.   Eyes:      General: Lids are normal.      Conjunctiva/sclera: Conjunctivae normal.      Pupils: Pupils are equal, round, and reactive to light.   Neck:      Thyroid: No thyromegaly.      Vascular: No JVD.      Trachea: No tracheal deviation.   Cardiovascular:      Rate and Rhythm: Normal rate and regular rhythm.      Pulses: Normal pulses.      Heart sounds: Normal heart sounds. No murmur heard.     No friction rub. No gallop.   Pulmonary:      Effort: Pulmonary effort is normal. No respiratory distress.      Breath sounds: Normal breath sounds. No stridor. No wheezing or rales.   Chest:      Chest wall: No tenderness.   Abdominal:      General: Bowel sounds are normal. There is no distension.       Palpations: Abdomen is soft. There is no mass.      Tenderness: There is abdominal tenderness in the periumbilical area. There is no guarding or rebound.      Hernia: No hernia is present.   Musculoskeletal:         General: Normal range of motion.      Cervical back: Normal range of motion and neck supple. No edema. Normal range of motion.   Lymphadenopathy:      Cervical: No cervical adenopathy.   Skin:     General: Skin is warm and dry.      Coloration: Skin is not pale.      Findings: No erythema or rash.   Neurological:      Mental Status: She is alert and oriented to person, place, and time.      GCS: GCS eye subscore is 4. GCS verbal subscore is 5. GCS motor subscore is 6.      Cranial Nerves: No cranial nerve deficit.      Sensory: No sensory deficit.      Deep Tendon Reflexes: Reflexes are normal and symmetric.   Psychiatric:         Speech: Speech normal.         Behavior: Behavior normal.         Vital Signs  ED Triage Vitals [03/19/24 1723]   Temperature Pulse Respirations Blood Pressure SpO2   98.1 °F (36.7 °C) (!) 116 20 (!) 158/103 100 %      Temp Source Heart Rate Source Patient Position - Orthostatic VS BP Location FiO2 (%)   Temporal Monitor Sitting Left arm --      Pain Score       --           Vitals:    03/19/24 1723 03/19/24 1800 03/19/24 1830   BP: (!) 158/103 141/90 134/83   Pulse: (!) 116 101 88   Patient Position - Orthostatic VS: Sitting Lying Lying         Visual Acuity      ED Medications  Medications   sodium chloride 0.9 % bolus 1,000 mL (1,000 mL Intravenous New Bag 3/19/24 2023)   sodium chloride 0.9 % bolus 1,000 mL (0 mL Intravenous Stopped 3/19/24 2023)   ondansetron (ZOFRAN) injection 4 mg (4 mg Intravenous Given 3/19/24 1748)   iohexol (OMNIPAQUE) 350 MG/ML injection (MULTI-DOSE) 100 mL (100 mL Intravenous Given 3/19/24 1739)   magnesium sulfate 2 g/50 mL IVPB (premix) 2 g (0 g Intravenous Stopped 3/19/24 1952)       Diagnostic Studies  Results Reviewed       Procedure Component  Value Units Date/Time    UA w Reflex to Microscopic w Reflex to Culture [[id number]]  (Abnormal) Collected: 03/19/24 1900    Lab Status: Final result Specimen: Urine, Clean Catch Updated: 03/19/24 1921     Color, UA Yellow     Clarity, UA Clear     Specific Gravity, UA <=1.005     pH, UA 6.5     Leukocytes, UA Negative     Nitrite, UA Negative     Protein, UA Negative mg/dl      Glucose, UA Negative mg/dl      Ketones, UA Trace mg/dl      Urobilinogen, UA 0.2 E.U./dl      Bilirubin, UA Small     Occult Blood, UA Negative    Blood culture #2 [[id number]] Collected: 03/19/24 1907    Lab Status: In process Specimen: Blood from Arm, Right Updated: 03/19/24 1911    Blood culture #1 [[id number]] Collected: 03/19/24 1907    Lab Status: In process Specimen: Blood from Arm, Left Updated: 03/19/24 1911    FLU/RSV/COVID - if FLU/RSV clinically relevant [[id number]]  (Normal) Collected: 03/19/24 1737    Lab Status: Final result Specimen: Nares from Nose Updated: 03/19/24 1843     SARS-CoV-2 Negative     INFLUENZA A PCR Negative     INFLUENZA B PCR Negative     RSV PCR Negative    Narrative:      FOR PEDIATRIC PATIENTS - copy/paste COVID Guidelines URL to browser: [email or web address]    SARS-CoV-2 assay is a Nucleic Acid Amplification assay intended for the  qualitative detection of nucleic acid from SARS-CoV-2 in nasopharyngeal  swabs. Results are for the presumptive identification of SARS-CoV-2 RNA.    Positive results are indicative of infection with SARS-CoV-2, the virus  causing COVID-19, but do not rule out bacterial infection or co-infection  with other viruses. Laboratories within the [place] and its  territories are required to report all positive results to the appropriate  public health authorities. Negative results do not preclude SARS-CoV-2  infection and should not be used as the sole basis for treatment or other  patient management decisions. Negative  results must be combined with  clinical observations, patient history, and epidemiological information.  This test has not been FDA cleared or approved.    This test has been authorized by FDA under an Emergency Use Authorization  (EUA). This test is only authorized for the duration of time the  declaration that circumstances exist justifying the authorization of the  emergency use of an in vitro diagnostic tests for detection of SARS-CoV-2  virus and/or diagnosis of COVID-19 infection under section 564(b)(1) of  the Act, 21 U.S.C. 360bbb-3(b)(1), unless the authorization is terminated  or revoked sooner. The test has been validated but independent review by FDA  and CLIA is pending.    Test performed using [employer] GeneXpert: This RT-PCR assay targets N2,  a region unique to SARS-CoV-2. A conserved region in the E-gene was chosen  for pan-Sarbecovirus detection which includes SARS-CoV-2.    According to CMS-2020-01-R, this platform meets the definition of high-throughput technology.    HS Troponin 0hr (reflex protocol) [[id number]]  (Normal) Collected: 03/19/24 1737    Lab Status: Final result Specimen: Blood from Arm, Right Updated: 03/19/24 1807     hs TnI 0hr 5 ng/L     Lactic acid, plasma (w/reflex if result > 2.0) [[id number]]  (Normal) Collected: 03/19/24 1737    Lab Status: Final result Specimen: Blood from Arm, Right Updated: 03/19/24 1803     LACTIC ACID 1.4 mmol/L     Narrative:      Result may be elevated if tourniquet was used during collection.    Comprehensive metabolic panel [[id number]]  (Abnormal) Collected: 03/19/24 1737    Lab Status: Final result Specimen: Blood from Arm, Right Updated: 03/19/24 1802     Sodium 134 mmol/L      Potassium 4.4 mmol/L      Chloride 102 mmol/L      CO2 21 mmol/L      ANION GAP 11 mmol/L      BUN 16 mg/dL      Creatinine 0.92 mg/dL      Glucose 144 mg/dL      Calcium 9.5 mg/dL      AST 17 U/L      ALT 22 U/L      Alkaline Phosphatase 59 U/L      Total Protein 7.2 g/dL       Albumin 4.1 g/dL      Total Bilirubin 0.63 mg/dL      eGFR 80 ml/min/1.73sq m     Narrative:      National Kidney Disease Foundation guidelines for Chronic Kidney Disease (CKD):     Stage 1 with normal or high GFR (GFR > 90 mL/min/1.73 square meters)    Stage 2 Mild CKD (GFR = 60-89 mL/min/1.73 square meters)    Stage 3A Moderate CKD (GFR = 45-59 mL/min/1.73 square meters)    Stage 3B Moderate CKD (GFR = 30-44 mL/min/1.73 square meters)    Stage 4 Severe CKD (GFR = 15-29 mL/min/1.73 square meters)    Stage 5 End Stage CKD (GFR <15 mL/min/1.73 square meters)  Note: GFR calculation is accurate only with a steady state creatinine    Lipase [[id number]]  (Abnormal) Collected: 03/19/24 1737    Lab Status: Final result Specimen: Blood from Arm, Right Updated: 03/19/24 1802     Lipase 6 u/L     Magnesium [[id number]]  (Abnormal) Collected: 03/19/24 1737    Lab Status: Final result Specimen: Blood from Arm, Right Updated: 03/19/24 1802     Magnesium 1.6 mg/dL     CBC and differential [[id number]]  (Abnormal) Collected: 03/19/24 1737    Lab Status: Final result Specimen: Blood from Arm, Right Updated: 03/19/24 1743     WBC 30.39 Thousand/uL      RBC 6.04 Million/uL      Hemoglobin 18.5 g/dL      Hematocrit 54.9 %      MCV 91 fL      MCH 30.6 pg      MCHC 33.7 g/dL      RDW 12.9 %      MPV 10.6 fL      Platelets 383 Thousands/uL      nRBC 0 /100 WBCs      Neutrophils Relative 86 %      Immature Grans % 1 %      Lymphocytes Relative 8 %      Monocytes Relative 3 %      Eosinophils Relative 2 %      Basophils Relative 0 %      Neutrophils Absolute 26.31 Thousands/µL      Absolute Immature Grans 0.24 Thousand/uL      Absolute Lymphocytes 2.52 Thousands/µL      Absolute Monocytes 0.76 Thousand/µL      Eosinophils Absolute 0.45 Thousand/µL      Basophils Absolute 0.11 Thousands/µL                    CT abdomen pelvis with contrast   Final Result by Anastacio Barron DO (03/19 1807)      1. Question mild mucosal hyperemia in a  small bowel loop in the pelvis. Cannot exclude mild infectious or inflammatory enteritis. No evidence of bowel obstruction.      2. Small amount of pelvic free fluid and trace abdominal fluid.      3. 3 mm nonobstructing lower pole left renal calculus.         Workstation performed: [id number]                    Procedures  ECG 12 Lead Documentation Only    Date/Time: 3/19/2024 6:16 PM    Performed by: Weston Castillo PA-C  Authorized by: Weston Castillo PA-C    Indications / Diagnosis:  Epigastric pain  ECG reviewed by me, the ED Provider: yes    Patient location:  ED  Previous ECG:     Comparison to cardiac monitor: Yes    Interpretation:     Interpretation: normal    Rate:     ECG rate:  93    ECG rate assessment: normal    Rhythm:     Rhythm: sinus rhythm    Ectopy:     Ectopy: none    QRS:     QRS axis:  Normal    QRS intervals:  Normal  Conduction:     Conduction: normal    ST segments:     ST segments:  Normal  T waves:     T waves: normal             ED Course  ED Course as of 03/19/24 2032   Tue Mar 19, 2024   1811 IMPRESSION:     1. Question mild mucosal hyperemia in a small bowel loop in the pelvis. Cannot exclude mild infectious or inflammatory enteritis. No evidence of bowel obstruction.     2. Small amount of pelvic free fluid and trace abdominal fluid.     3. 3 mm nonobstructing lower pole left renal calculus.        2022 Patient was updated of condition at bedside she looks remarkably well her heart rate is under 100 bpm she is not hypotensive she has no other further complaints she is drinking liquids here she does have a significant white blood cell count elevation however she does not have any lactic acid elevation she likely has enteritis.  I recommend taking the Bactrim she was prescribed as well as the Zofran she was prescribed I will add Bentyl.  She is in agreement with this plan she was educated on return precautions.             HEART Risk Score      Flowsheet Row Most Recent  Value   Heart Score Risk Calculator    History 0 Filed at: 03/19/2024 1816   ECG 0 Filed at: 03/19/2024 1816   Age 0 Filed at: 03/19/2024 1816   Risk Factors 0 Filed at: 03/19/2024 1816   Troponin 0 Filed at: 03/19/2024 1816   HEART Score 0 Filed at: 03/19/2024 1816                          SBIRT 22yo+      Flowsheet Row Most Recent Value   Initial Alcohol Screen: US AUDIT-C     1. How often do you have a drink containing alcohol? 0 Filed at: 03/19/2024 1722   2. How many drinks containing alcohol do you have on a typical day you are drinking?  0 Filed at: 03/19/2024 1722   3a. Male UNDER 65: How often do you have five or more drinks on one occasion? 0 Filed at: 03/19/2024 1722   3b. FEMALE Any Age, or MALE 65+: How often do you have 4 or more drinks on one occassion? 0 Filed at: 03/19/2024 1722   Audit-C Score 0 Filed at: 03/19/2024 1722   [place]: How many times in the past year have you...    Used an illegal drug or used a prescription medication for non-medical reasons? Never Filed at: 03/19/2024 1722                      Medical Decision Making  34-year-old female here for evaluation abdominal pain vomiting diarrhea.  Seen in a separate network emergency department diagnosed with UTI discharged on Bactrim and Zofran patient states she is unable to keep down this medication and presented here for second opinion.  She does have some mild tachycardia noted here 116 bpm however otherwise well-appearing.  Differential diagnosis includes gastroenteritis, pancreatitis, appendicitis, sepsis, pyelonephritis.    Workup is significant for no evidence of hyperemia and small bowel loops which could be an infectious enteritis, she does have an impressive leukocytosis of 30,000 however there is no lactic acidosis she is nontachycardic she is well-appearing and not hypotensive.  Bedside there is no fever.  This could be slightly exacerbated by some mild dehydration.  She does have known UTI and is prescribed Bactrim.  I discussed  with her the options she feels comfortable going home she will return if she notes fevers or worsening symptoms.    Amount and/or Complexity of Data Reviewed  Labs: ordered.  Radiology: ordered.    Risk  Prescription drug management.             Disposition  Final diagnoses:   Hypomagnesemia   Leukocytosis   Gastroenteritis     Time reflects when diagnosis was documented in both MDM as applicable and the Disposition within this note       Time User Action Codes Description Comment    3/19/2024  6:12 PM Weston Castillo Add [E83.42] Hypomagnesemia     3/19/2024  8:30 PM Weston Castillo Add [D72.829] Leukocytosis     3/19/2024  8:30 PM Weston Castillo Add [K52.9] Gastroenteritis           ED Disposition       ED Disposition   Discharge    Condition   Stable    Date/Time   Tue Mar 19, 2024 2030    Comment   Graciela Patterson discharge to home/self care.                   Follow-up Information    None         Patient's Medications   Discharge Prescriptions    DICYCLOMINE (BENTYL) 20 MG TABLET    Take 1 tablet (20 mg total) by mouth 2 (two) times a day       Start Date: 3/19/2024 End Date: --       Order Dose: 20 mg       Quantity: 20 tablet    Refills: 0       No discharge procedures on file.    PDMP Review       None            ED Provider  Electronically Signed by             Weston Castillo PA-C  03/19/24 2032

## 2024-03-22 LAB
ATRIAL RATE: 93 BPM
P AXIS: 42 DEGREES
PR INTERVAL: 122 MS
QRS AXIS: 67 DEGREES
QRSD INTERVAL: 84 MS
QT INTERVAL: 368 MS
QTC INTERVAL: 457 MS
T WAVE AXIS: 46 DEGREES
VENTRICULAR RATE: 93 BPM

## 2024-03-22 PROCEDURE — 93010 ELECTROCARDIOGRAM REPORT: CPT | Performed by: INTERNAL MEDICINE

## 2024-03-24 LAB
BACTERIA BLD CULT: NORMAL
BACTERIA BLD CULT: NORMAL

## 2024-03-25 LAB
BACTERIA BLD CULT: NORMAL
BACTERIA BLD CULT: NORMAL

## 2024-06-15 DIAGNOSIS — Z00.6 ENCOUNTER FOR EXAMINATION FOR NORMAL COMPARISON OR CONTROL IN CLINICAL RESEARCH PROGRAM: ICD-10-CM

## 2024-08-13 ENCOUNTER — HOSPITAL ENCOUNTER (EMERGENCY)
Facility: HOSPITAL | Age: 34
Discharge: HOME | End: 2024-08-13
Attending: EMERGENCY MEDICINE | Admitting: EMERGENCY MEDICINE
Payer: COMMERCIAL

## 2024-08-13 ENCOUNTER — APPOINTMENT (EMERGENCY)
Dept: CT IMAGING | Facility: HOSPITAL | Age: 34
End: 2024-08-13
Payer: COMMERCIAL

## 2024-08-13 ENCOUNTER — APPOINTMENT (EMERGENCY)
Dept: RADIOLOGY | Facility: HOSPITAL | Age: 34
End: 2024-08-13
Payer: COMMERCIAL

## 2024-08-13 VITALS
TEMPERATURE: 97.5 F | OXYGEN SATURATION: 98 % | DIASTOLIC BLOOD PRESSURE: 84 MMHG | SYSTOLIC BLOOD PRESSURE: 128 MMHG | RESPIRATION RATE: 20 BRPM | HEART RATE: 62 BPM

## 2024-08-13 DIAGNOSIS — N30.00 ACUTE CYSTITIS: ICD-10-CM

## 2024-08-13 DIAGNOSIS — R55 VASOVAGAL SYNCOPE: Primary | ICD-10-CM

## 2024-08-13 DIAGNOSIS — K52.9 ACUTE COLITIS: ICD-10-CM

## 2024-08-13 DIAGNOSIS — R11.2 NAUSEA AND VOMITING: ICD-10-CM

## 2024-08-13 LAB
ALBUMIN SERPL BCG-MCNC: 4.3 G/DL (ref 3.5–5)
ALP SERPL-CCNC: 55 U/L (ref 34–104)
ALT SERPL W P-5'-P-CCNC: 10 U/L (ref 7–52)
ANION GAP SERPL CALCULATED.3IONS-SCNC: 10 MMOL/L (ref 4–13)
AST SERPL W P-5'-P-CCNC: 12 U/L (ref 13–39)
ATRIAL RATE: 92 BPM
BACTERIA UR QL AUTO: ABNORMAL /HPF
BASOPHILS # BLD AUTO: 0.04 THOUSANDS/ÂΜL (ref 0–0.1)
BASOPHILS NFR BLD AUTO: 0 % (ref 0–1)
BILIRUB SERPL-MCNC: 0.72 MG/DL (ref 0.2–1)
BILIRUB UR QL STRIP: NEGATIVE
BUN SERPL-MCNC: 9 MG/DL (ref 5–25)
CALCIUM SERPL-MCNC: 9.8 MG/DL (ref 8.4–10.2)
CARDIAC TROPONIN I PNL SERPL HS: <2 NG/L
CARDIAC TROPONIN I PNL SERPL HS: <2 NG/L
CHLORIDE SERPL-SCNC: 101 MMOL/L (ref 96–108)
CLARITY UR: CLEAR
CO2 SERPL-SCNC: 25 MMOL/L (ref 21–32)
COLOR UR: YELLOW
CREAT SERPL-MCNC: 0.82 MG/DL (ref 0.6–1.3)
EOSINOPHIL # BLD AUTO: 0.39 THOUSAND/ÂΜL (ref 0–0.61)
EOSINOPHIL NFR BLD AUTO: 3 % (ref 0–6)
ERYTHROCYTE [DISTWIDTH] IN BLOOD BY AUTOMATED COUNT: 12.9 % (ref 11.6–15.1)
GFR SERPL CREATININE-BSD FRML MDRD: 92 ML/MIN/1.73SQ M
GLUCOSE SERPL-MCNC: 86 MG/DL (ref 65–140)
GLUCOSE UR STRIP-MCNC: NEGATIVE MG/DL
HCT VFR BLD AUTO: 49.2 % (ref 34.8–46.1)
HGB BLD-MCNC: 16.3 G/DL (ref 11.5–15.4)
HGB UR QL STRIP.AUTO: NEGATIVE
IMM GRANULOCYTES # BLD AUTO: 0.07 THOUSAND/UL (ref 0–0.2)
IMM GRANULOCYTES NFR BLD AUTO: 1 % (ref 0–2)
KETONES UR STRIP-MCNC: ABNORMAL MG/DL
LACTATE SERPL-SCNC: 1.2 MMOL/L (ref 0.5–2)
LEUKOCYTE ESTERASE UR QL STRIP: ABNORMAL
LIPASE SERPL-CCNC: <6 U/L (ref 11–82)
LYMPHOCYTES # BLD AUTO: 2.93 THOUSANDS/ÂΜL (ref 0.6–4.47)
LYMPHOCYTES NFR BLD AUTO: 21 % (ref 14–44)
MAGNESIUM SERPL-MCNC: 2.1 MG/DL (ref 1.9–2.7)
MCH RBC QN AUTO: 30.2 PG (ref 26.8–34.3)
MCHC RBC AUTO-ENTMCNC: 33.1 G/DL (ref 31.4–37.4)
MCV RBC AUTO: 91 FL (ref 82–98)
MONOCYTES # BLD AUTO: 0.5 THOUSAND/ÂΜL (ref 0.17–1.22)
MONOCYTES NFR BLD AUTO: 4 % (ref 4–12)
MUCOUS THREADS UR QL AUTO: ABNORMAL
NEUTROPHILS # BLD AUTO: 10.32 THOUSANDS/ÂΜL (ref 1.85–7.62)
NEUTS SEG NFR BLD AUTO: 71 % (ref 43–75)
NITRITE UR QL STRIP: NEGATIVE
NON-SQ EPI CELLS URNS QL MICRO: ABNORMAL /HPF
NRBC BLD AUTO-RTO: 0 /100 WBCS
P AXIS: 48 DEGREES
PH UR STRIP.AUTO: 6 [PH]
PLATELET # BLD AUTO: 331 THOUSANDS/UL (ref 149–390)
PMV BLD AUTO: 10.7 FL (ref 8.9–12.7)
POTASSIUM SERPL-SCNC: 4.3 MMOL/L (ref 3.5–5.3)
PR INTERVAL: 126 MS
PROT SERPL-MCNC: 7.7 G/DL (ref 6.4–8.4)
PROT UR STRIP-MCNC: ABNORMAL MG/DL
QRS AXIS: 93 DEGREES
QRSD INTERVAL: 86 MS
QT INTERVAL: 400 MS
QTC INTERVAL: 494 MS
RBC # BLD AUTO: 5.39 MILLION/UL (ref 3.81–5.12)
RBC #/AREA URNS AUTO: ABNORMAL /HPF
SODIUM SERPL-SCNC: 136 MMOL/L (ref 135–147)
SP GR UR STRIP.AUTO: 1.02 (ref 1–1.03)
T WAVE AXIS: 61 DEGREES
TSH SERPL DL<=0.05 MIU/L-ACNC: 2.1 UIU/ML (ref 0.45–4.5)
UROBILINOGEN UR STRIP-ACNC: <2 MG/DL
VENTRICULAR RATE: 92 BPM
WBC # BLD AUTO: 14.25 THOUSAND/UL (ref 4.31–10.16)
WBC #/AREA URNS AUTO: ABNORMAL /HPF

## 2024-08-13 PROCEDURE — 84443 ASSAY THYROID STIM HORMONE: CPT | Performed by: EMERGENCY MEDICINE

## 2024-08-13 PROCEDURE — 71045 X-RAY EXAM CHEST 1 VIEW: CPT

## 2024-08-13 PROCEDURE — 99285 EMERGENCY DEPT VISIT HI MDM: CPT | Performed by: EMERGENCY MEDICINE

## 2024-08-13 PROCEDURE — 72125 CT NECK SPINE W/O DYE: CPT

## 2024-08-13 PROCEDURE — 83605 ASSAY OF LACTIC ACID: CPT | Performed by: EMERGENCY MEDICINE

## 2024-08-13 PROCEDURE — 83735 ASSAY OF MAGNESIUM: CPT | Performed by: EMERGENCY MEDICINE

## 2024-08-13 PROCEDURE — 70450 CT HEAD/BRAIN W/O DYE: CPT

## 2024-08-13 PROCEDURE — 81001 URINALYSIS AUTO W/SCOPE: CPT | Performed by: EMERGENCY MEDICINE

## 2024-08-13 PROCEDURE — 83690 ASSAY OF LIPASE: CPT | Performed by: EMERGENCY MEDICINE

## 2024-08-13 PROCEDURE — 74177 CT ABD & PELVIS W/CONTRAST: CPT

## 2024-08-13 PROCEDURE — 96365 THER/PROPH/DIAG IV INF INIT: CPT

## 2024-08-13 PROCEDURE — 84484 ASSAY OF TROPONIN QUANT: CPT | Performed by: EMERGENCY MEDICINE

## 2024-08-13 PROCEDURE — 85025 COMPLETE CBC W/AUTO DIFF WBC: CPT | Performed by: EMERGENCY MEDICINE

## 2024-08-13 PROCEDURE — 80053 COMPREHEN METABOLIC PANEL: CPT | Performed by: EMERGENCY MEDICINE

## 2024-08-13 PROCEDURE — 99284 EMERGENCY DEPT VISIT MOD MDM: CPT

## 2024-08-13 PROCEDURE — 36415 COLL VENOUS BLD VENIPUNCTURE: CPT | Performed by: EMERGENCY MEDICINE

## 2024-08-13 PROCEDURE — 93010 ELECTROCARDIOGRAM REPORT: CPT | Performed by: INTERNAL MEDICINE

## 2024-08-13 PROCEDURE — 96375 TX/PRO/DX INJ NEW DRUG ADDON: CPT

## 2024-08-13 PROCEDURE — 93005 ELECTROCARDIOGRAM TRACING: CPT

## 2024-08-13 RX ORDER — ONDANSETRON 4 MG/1
4 TABLET, ORALLY DISINTEGRATING ORAL EVERY 8 HOURS PRN
Qty: 30 TABLET | Refills: 0 | Status: SHIPPED | OUTPATIENT
Start: 2024-08-13

## 2024-08-13 RX ORDER — CIPROFLOXACIN 500 MG/1
500 TABLET, FILM COATED ORAL ONCE
Status: COMPLETED | OUTPATIENT
Start: 2024-08-13 | End: 2024-08-13

## 2024-08-13 RX ORDER — SODIUM CHLORIDE, SODIUM GLUCONATE, SODIUM ACETATE, POTASSIUM CHLORIDE, MAGNESIUM CHLORIDE, SODIUM PHOSPHATE, DIBASIC, AND POTASSIUM PHOSPHATE .53; .5; .37; .037; .03; .012; .00082 G/100ML; G/100ML; G/100ML; G/100ML; G/100ML; G/100ML; G/100ML
1000 INJECTION, SOLUTION INTRAVENOUS ONCE
Status: COMPLETED | OUTPATIENT
Start: 2024-08-13 | End: 2024-08-13

## 2024-08-13 RX ORDER — METRONIDAZOLE 500 MG/1
500 TABLET ORAL EVERY 12 HOURS SCHEDULED
Qty: 10 TABLET | Refills: 0 | Status: SHIPPED | OUTPATIENT
Start: 2024-08-13 | End: 2024-08-18

## 2024-08-13 RX ORDER — CIPROFLOXACIN 500 MG/1
500 TABLET, FILM COATED ORAL 2 TIMES DAILY
Qty: 10 TABLET | Refills: 0 | Status: SHIPPED | OUTPATIENT
Start: 2024-08-13 | End: 2024-08-18

## 2024-08-13 RX ORDER — METRONIDAZOLE 500 MG/1
500 TABLET ORAL ONCE
Status: COMPLETED | OUTPATIENT
Start: 2024-08-13 | End: 2024-08-13

## 2024-08-13 RX ORDER — ONDANSETRON 2 MG/ML
4 INJECTION INTRAMUSCULAR; INTRAVENOUS ONCE
Status: COMPLETED | OUTPATIENT
Start: 2024-08-13 | End: 2024-08-13

## 2024-08-13 RX ADMIN — METRONIDAZOLE 500 MG: 500 TABLET ORAL at 22:28

## 2024-08-13 RX ADMIN — ONDANSETRON 4 MG: 2 INJECTION INTRAMUSCULAR; INTRAVENOUS at 18:51

## 2024-08-13 RX ADMIN — IOHEXOL 100 ML: 350 INJECTION, SOLUTION INTRAVENOUS at 19:54

## 2024-08-13 RX ADMIN — SODIUM CHLORIDE, SODIUM GLUCONATE, SODIUM ACETATE, POTASSIUM CHLORIDE, MAGNESIUM CHLORIDE, SODIUM PHOSPHATE, DIBASIC, AND POTASSIUM PHOSPHATE 1000 ML: .53; .5; .37; .037; .03; .012; .00082 INJECTION, SOLUTION INTRAVENOUS at 18:51

## 2024-08-13 RX ADMIN — CIPROFLOXACIN 500 MG: 500 TABLET ORAL at 22:28

## 2024-08-13 NOTE — Clinical Note
Bhavik Patterson accompanied Graciela Patterson to the emergency department on 8/13/2024.    Return date if applicable: 08/15/2024        If you have any questions or concerns, please don't hesitate to call.      Liz Starr MD

## 2024-08-13 NOTE — ED PROVIDER NOTES
History  Chief Complaint   Patient presents with    Syncope     Patient c/o abdominal pain, diarrhea, vomiting since Saturday, states she passed out earlier today. States unknown if she hit her head, no thinners     34-year-old female with 4-day history of nausea vomiting and diarrhea she has been vomiting cell-free type material she has had watery stools but denies any melena or hematochezia and has fiery diffuse abdominal pain she has been unable to keep anything down there is no history of sick contacts no recent antibiotics no travel no prior history of C. difficile colitis.  She is currently on Zepbound for weight loss.  Today she describes having a syncopal spell while sitting on the commode having a bowel movement and being nauseated at the same time she became extremely hot to her and she experienced tunnel vision she did not want to pass out on the tile floor so she stood up got through the door and then woke up on the floor she believes she hit her neck on the countertop she believes she was only out for seconds.  Patient has a mild headache she denies any visual disturbance or occlusion no nosebleed she had no shortness of breath or pleuritic pain no rib pain no prior history of PE or DVT.  She has some mild diffuse lateral neck pain no numbness paresthesias she feels fatigued and weak all over abdomen does feel bloated she is more prone to constipation.  Denies prior history of PE or DVT no prior history of syncope.  He feels generally weak.  She is concerned about her magnesium because that has been low in the past.  Has medical history arthritis UTI past surgical history cholecystectomy hysterectomy  prior bariatric surgery she follows with bariatrics for weight loss and has achieved a weight loss of 90 pounds.        Prior to Admission Medications   Prescriptions Last Dose Informant Patient Reported? Taking?   dicyclomine (BENTYL) 20 mg tablet   No No   Sig: Take 1 tablet (20 mg total) by  mouth 2 (two) times a day   nitrofurantoin (MACROBID) 100 mg capsule   No No   Sig: Take 1 capsule (100 mg total) by mouth 2 (two) times a day      Facility-Administered Medications: None       Past Medical History:   Diagnosis Date    Arthritis     Psychiatric disorder        Past Surgical History:   Procedure Laterality Date     SECTION      3 times    CHOLECYSTECTOMY      HYSTERECTOMY         History reviewed. No pertinent family history.  I have reviewed and agree with the history as documented.    E-Cigarette/Vaping    E-Cigarette Use Never User      E-Cigarette/Vaping Substances     Social History     Tobacco Use    Smoking status: Every Day     Current packs/day: 0.25     Types: Cigarettes    Smokeless tobacco: Never   Vaping Use    Vaping status: Never Used   Substance Use Topics    Alcohol use: Not Currently    Drug use: Never       Review of Systems   Constitutional:  Positive for activity change, appetite change and fatigue. Negative for chills, diaphoresis and fever.   HENT:  Negative for congestion, ear pain, rhinorrhea, sneezing and sore throat.    Eyes:  Negative for discharge and visual disturbance.   Respiratory:  Negative for cough and shortness of breath.    Cardiovascular:  Negative for chest pain and leg swelling.   Gastrointestinal:  Positive for abdominal pain, diarrhea, nausea and vomiting. Negative for abdominal distention, anal bleeding and blood in stool.   Endocrine: Negative for polyuria.   Genitourinary:  Negative for difficulty urinating, dysuria, flank pain, frequency, urgency, vaginal bleeding and vaginal discharge.   Musculoskeletal:  Positive for neck pain. Negative for back pain, myalgias and neck stiffness.   Skin:  Negative for rash.   Neurological:  Positive for syncope, light-headedness and headaches. Negative for dizziness, speech difficulty, weakness and numbness.   Hematological:  Negative for adenopathy.   Psychiatric/Behavioral:  Negative for confusion.    All  other systems reviewed and are negative.      Physical Exam  Physical Exam  Vitals and nursing note reviewed.   Constitutional:       General: She is not in acute distress.     Appearance: She is well-developed. She is not ill-appearing, toxic-appearing or diaphoretic.   HENT:      Head: Normocephalic and atraumatic.      Right Ear: Tympanic membrane and external ear normal.      Left Ear: Tympanic membrane and external ear normal.      Nose: Nose normal.      Mouth/Throat:      Mouth: Mucous membranes are moist.      Pharynx: No oropharyngeal exudate or posterior oropharyngeal erythema.   Eyes:      General:         Right eye: No discharge.         Left eye: No discharge.      Extraocular Movements: Extraocular movements intact.      Conjunctiva/sclera: Conjunctivae normal.      Pupils: Pupils are equal, round, and reactive to light.   Neck:      Comments: No midline tenderness diffuse Paraspinous tenderness  Cardiovascular:      Rate and Rhythm: Normal rate and regular rhythm.      Pulses: Normal pulses.      Heart sounds: Normal heart sounds.   Pulmonary:      Effort: Pulmonary effort is normal. No respiratory distress.      Breath sounds: Normal breath sounds. No stridor. No wheezing, rhonchi or rales.   Abdominal:      General: Bowel sounds are normal. There is no distension.      Palpations: Abdomen is soft.      Tenderness: There is abdominal tenderness (diffuse). There is no right CVA tenderness, left CVA tenderness, guarding or rebound.      Comments: Back no midline or CVA tenderness   Musculoskeletal:         General: No deformity. Normal range of motion.      Cervical back: Normal range of motion and neck supple. Tenderness present. No rigidity.      Right lower leg: No edema.      Left lower leg: No edema.   Lymphadenopathy:      Cervical: No cervical adenopathy.   Skin:     General: Skin is warm and dry.      Capillary Refill: Capillary refill takes less than 2 seconds.   Neurological:      Mental  Status: She is alert and oriented to person, place, and time.      Cranial Nerves: No cranial nerve deficit.      Sensory: No sensory deficit.      Motor: No weakness or abnormal muscle tone.      Coordination: Coordination normal.      Gait: Gait normal.      Comments: Gait steady   Psychiatric:         Mood and Affect: Mood normal.         Vital Signs  ED Triage Vitals [08/13/24 1818]   Temperature Pulse Respirations Blood Pressure SpO2   97.5 °F (36.4 °C) 81 20 (!) 171/87 99 %      Temp Source Heart Rate Source Patient Position - Orthostatic VS BP Location FiO2 (%)   Temporal Monitor -- -- --      Pain Score       --           Vitals:    08/13/24 1818 08/13/24 1915 08/13/24 2000 08/13/24 2100   BP: (!) 171/87 136/88 154/97 128/84   Pulse: 81 75 82 62         Visual Acuity      ED Medications  Medications   ondansetron (ZOFRAN) injection 4 mg (4 mg Intravenous Given 8/13/24 1851)   multi-electrolyte (ISOLYTE-S PH 7.4) bolus 1,000 mL (0 mL Intravenous Stopped 8/13/24 1951)   iohexol (OMNIPAQUE) 350 MG/ML injection (MULTI-DOSE) 100 mL (100 mL Intravenous Given 8/13/24 1954)   ciprofloxacin (CIPRO) tablet 500 mg (500 mg Oral Given 8/13/24 2228)   metroNIDAZOLE (FLAGYL) tablet 500 mg (500 mg Oral Given 8/13/24 2228)       Diagnostic Studies  Results Reviewed       Procedure Component Value Units Date/Time    HS Troponin I 2hr [[id number]] Collected: 08/13/24 2054    Lab Status: Final result Specimen: Blood from Arm, Right Updated: 08/13/24 2131     hs TnI 2hr <2 ng/L      Delta 2hr hsTnI --    Urine Microscopic [[id number]]  (Abnormal) Collected: 08/13/24 1911    Lab Status: Final result Specimen: Urine, Clean Catch Updated: 08/13/24 1957     RBC, UA 1-2 /hpf      WBC, UA 1-2 /hpf      Epithelial Cells Occasional /hpf      Bacteria, UA Occasional /hpf      MUCUS THREADS Occasional    TSH, 3rd generation with Free T4 reflex [[id number]]  (Normal) Collected: 08/13/24 1852    Lab Status: Final result Specimen: Blood from  Arm, Right Updated: 08/13/24 1938     TSH 3RD GENERATON 2.101 uIU/mL     UA w Reflex to Microscopic w Reflex to Culture [[id number]]  (Abnormal) Collected: 08/13/24 1911    Lab Status: Final result Specimen: Urine, Clean Catch Updated: 08/13/24 1934     Color, UA Yellow     Clarity, UA Clear     Specific Gravity, UA 1.025     pH, UA 6.0     Leukocytes, UA Small     Nitrite, UA Negative     Protein, UA Trace mg/dl      Glucose, UA Negative mg/dl      Ketones, UA Trace mg/dl      Urobilinogen, UA <2.0 mg/dl      Bilirubin, UA Negative     Occult Blood, UA Negative    HS Troponin 0hr (reflex protocol) [[id number]]  (Normal) Collected: 08/13/24 1852    Lab Status: Final result Specimen: Blood from Arm, Right Updated: 08/13/24 1928     hs TnI 0hr <2 ng/L     Comprehensive metabolic panel [[id number]]  (Abnormal) Collected: 08/13/24 1852    Lab Status: Final result Specimen: Blood from Arm, Right Updated: 08/13/24 1924     Sodium 136 mmol/L      Potassium 4.3 mmol/L      Chloride 101 mmol/L      CO2 25 mmol/L      ANION GAP 10 mmol/L      BUN 9 mg/dL      Creatinine 0.82 mg/dL      Glucose 86 mg/dL      Calcium 9.8 mg/dL      AST 12 U/L      ALT 10 U/L      Alkaline Phosphatase 55 U/L      Total Protein 7.7 g/dL      Albumin 4.3 g/dL      Total Bilirubin 0.72 mg/dL      eGFR 92 ml/min/1.73sq m     Narrative:      National Kid[employer] guidelines for Chronic Kidney Disease (CKD):     Stage 1 with normal or high GFR (GFR > 90 mL/min/1.73 square meters)    Stage 2 Mild CKD (GFR = 60-89 mL/min/1.73 square meters)    Stage 3A Moderate CKD (GFR = 45-59 mL/min/1.73 square meters)    Stage 3B Moderate CKD (GFR = 30-44 mL/min/1.73 square meters)    Stage 4 Severe CKD (GFR = 15-29 mL/min/1.73 square meters)    Stage 5 End Stage CKD (GFR <15 mL/min/1.73 square meters)  Note: GFR calculation is accurate only with a steady state creatinine    Magnesium [[id number]]  (Normal) Collected: 08/13/24 1852    Lab Status: Final  result Specimen: Blood from Arm, Right Updated: 08/13/24 1924     Magnesium 2.1 mg/dL     Lipase [[id number]]  (Abnormal) Collected: 08/13/24 1852    Lab Status: Final result Specimen: Blood from Arm, Right Updated: 08/13/24 1924     Lipase <6 u/L     Lactic acid, plasma (w/reflex if result > 2.0) [[id number]]  (Normal) Collected: 08/13/24 1852    Lab Status: Final result Specimen: Blood from Arm, Right Updated: 08/13/24 1924     LACTIC ACID 1.2 mmol/L     Narrative:      Result may be elevated if tourniquet was used during collection.    CBC and differential [[id number]]  (Abnormal) Collected: 08/13/24 1852    Lab Status: Final result Specimen: Blood from Arm, Right Updated: 08/13/24 1906     WBC 14.25 Thousand/uL      RBC 5.39 Million/uL      Hemoglobin 16.3 g/dL      Hematocrit 49.2 %      MCV 91 fL      MCH 30.2 pg      MCHC 33.1 g/dL      RDW 12.9 %      MPV 10.7 fL      Platelets 331 Thousands/uL      nRBC 0 /100 WBCs      Segmented % 71 %      Immature Grans % 1 %      Lymphocytes % 21 %      Monocytes % 4 %      Eosinophils Relative 3 %      Basophils Relative 0 %      Absolute Neutrophils 10.32 Thousands/µL      Absolute Immature Grans 0.07 Thousand/uL      Absolute Lymphocytes 2.93 Thousands/µL      Absolute Monocytes 0.50 Thousand/µL      Eosinophils Absolute 0.39 Thousand/µL      Basophils Absolute 0.04 Thousands/µL                    CT head without contrast   Final Result by Freya Muro MD (08/13 2030)      No acute intracranial abnormality.      Small air-fluid levels in bilateral frontal sinuses may represent acute sinusitis in the appropriate context.                  Workstation performed: [id number]         CT cervical spine without contrast   Final Result by Freya Muro MD (08/13 2029)      No cervical spine fracture or traumatic malalignment.                  Workstation performed: [id number]         CT abdomen pelvis with contrast   Final Result by Jeff Escamilla MD (08/13 2030)   1.  Colonic wall thickening and mild inflammatory change concerning for uncomplicated nonspecific infectious or inflammatory colitis, predominantly left-sided.   2. Possible cystitis. Correlate with urinalysis.   2. Left lower renal pole punctate nonobstructive calculus without additional urinary calculi.         Workstation performed: [id number]         XR chest 1 view portable   ED Interpretation by Liz Starr MD (08/13 2140)   Per my independent interpretation. Radiologist to provide formal read. No acute process no ptx                 Procedures  ECG 12 Lead Documentation Only    Date/Time: 8/13/2024 6:28 PM    Performed by: Liz Starr MD  Authorized by: Liz Starr MD    Indications / Diagnosis:  Syncope  ECG reviewed by me, the ED Provider: yes    Patient location:  ED  Previous ECG:     Previous ECG:  Compared to current    Comparison ECG info:  3/19/24 1756    Similarity:  Changes noted  Rate:     ECG rate:  92    ECG rate assessment: normal    Rhythm:     Rhythm: sinus rhythm    QRS:     QRS axis:  Right  Comments:      ; nonspecific twave changes           ED Course  ED Course as of 08/14/24 0026   e Aug 13, 2024   2151 Prior hx of pansentive ecoli from [facility] UC 3/2024   2207 Extensively reviewed labs and CT findings coliitis acute cystitis and nonobsructive kidney stone. Allergy to cephalosporin (rocephin) will cover with 5 days of cipro/flagyl no signs of sepsis or significant dehydration reviewed if she feels like going to passout to lie flat or put head b/w knees until symptoms pass               HEART Risk Score      Flowsheet Row Most Recent Value   Heart Score Risk Calculator    History 0 Filed at: 08/13/2024 1831   ECG 1 Filed at: 08/13/2024 1831   Age 0 Filed at: 08/13/2024 1831   Risk Factors 1 Filed at: 08/13/2024 1831   Troponin 0 Filed at: 08/13/2024 1831   HEART Score 2 Filed at: 08/13/2024 1831                                        Medical Decision  Making  Mdm: Patient presents with 4-day history of nausea vomiting and diarrhea this consists of watery stools over the last 4 days she is lost 8 pounds.  And based off of history of episode of vasovagal syncope due to the fact that she became lightheaded hot with narrowed tunnel vision while having a bowel movements and actively throwing up.  Due to complaints of persistent headache and neck pain after the syncopal event will undergo CT of the head as well as CT of the C-spine.  At this time her neurologic exam is nonfocal.  Will check chest x-ray and proceed with CT abdomen pelvis to assess for colitis.  Will check labs for any anemia transaminitis pancreatitis lecture light abnormality initiate hydration with antiemetics and reevaluate    Amount and/or Complexity of Data Reviewed  Labs: ordered.  Radiology: ordered and independent interpretation performed.    Risk  Prescription drug management.                 Disposition  Final diagnoses:   Vasovagal syncope   Acute colitis   Acute cystitis   Nausea and vomiting     Time reflects when diagnosis was documented in both MDM as applicable and the Disposition within this note       Time User Action Codes Description Comment    8/13/2024 10:11 PM Liz Starr [R55] Vasovagal syncope     8/13/2024 10:11 PM Liz Starr [K52.9] Acute colitis     8/13/2024 10:12 PM Liz Starr [N30.00] Acute cystitis     8/13/2024 10:12 PM Liz Starr [R11.2] Nausea and vomiting           ED Disposition       ED Disposition   Discharge    Condition   Stable    Date/Time   Tue Aug 13, 2024 2213    Comment   Graciela Patterson discharge to home/self care.                   Follow-up Information       Follow up With Specialties Details Why Contact Info Additional Information    Shyam Viera MD Family Medicine Call in 1 day recheck of symptoms in 1 week [address] [place] [address]  [phone]       [facility]  [facility]  or your GI doctor for recheck of colitis [address] [facility] [id number] [phone] [facility] [place], [address] , [place], [place], [address] [phone]            Discharge Medication List as of 8/13/2024 10:24 PM        START taking these medications    Details   ciprofloxacin (CIPRO) 500 mg tablet Take 1 tablet (500 mg total) by mouth 2 (two) times a day for 5 days, Starting Tue 8/13/2024, Until Sun 8/18/2024, Normal      metroNIDAZOLE (FLAGYL) 500 mg tablet Take 1 tablet (500 mg total) by mouth every 12 (twelve) hours for 5 days, Starting Tue 8/13/2024, Until Sun 8/18/2024, Normal      ondansetron (ZOFRAN-ODT) 4 mg disintegrating tablet Take 1 tablet (4 mg total) by mouth every 8 (eight) hours as needed for nausea or vomiting for up to 30 doses, Starting Tue 8/13/2024, Normal           CONTINUE these medications which have NOT CHANGED    Details   dicyclomine (BENTYL) 20 mg tablet Take 1 tablet (20 mg total) by mouth 2 (two) times a day, Starting Tue 3/19/2024, Normal      nitrofurantoin (MACROBID) 100 mg capsule Take 1 capsule (100 mg total) by mouth 2 (two) times a day, Starting Wed 5/17/2023, Normal             No discharge procedures on file.    PDMP Review       None            ED Provider  Electronically Signed by             Liz Starr MD  08/14/24 0026

## 2024-08-14 NOTE — DISCHARGE INSTRUCTIONS
zofran as needed for nausea.  Drink 2 to 3 liters of fliuds daily - water, diluted apple juice, sports drinks,   Bannas rice applesauce toast initially then once stomach feels better/nausea improved can advance as tolerated.  Avoid high fiber, raw veggies, corn, peas for 1 week. (harder for colon to digest)  Finish 5 days of cipro and flagyl antibiotics  Tylenol 650mg every 6 hours as needed for pain, fever (max 3000mg in 24 hours)  Take pro-biotic over the counter, or acidophilus tablet (in vitamin aisle)   Avoid immodium or lomotil - recommend pro-biotics instead   If you feel like you are going to pass out lie flat or put head between knees until symptoms pass   Return fever failure to keep fluids down worsening or changing abdominal pain lightheadedness chest pain shortness of breath black stools red stools or any new or worsening symptoms.